# Patient Record
Sex: FEMALE | Race: WHITE | NOT HISPANIC OR LATINO | URBAN - METROPOLITAN AREA
[De-identification: names, ages, dates, MRNs, and addresses within clinical notes are randomized per-mention and may not be internally consistent; named-entity substitution may affect disease eponyms.]

---

## 2023-08-16 ENCOUNTER — APPOINTMENT (OUTPATIENT)
Dept: PRIMARY CARE | Facility: CLINIC | Age: 48
End: 2023-08-16
Payer: COMMERCIAL

## 2023-08-18 ENCOUNTER — OFFICE VISIT (OUTPATIENT)
Dept: PRIMARY CARE | Facility: CLINIC | Age: 48
End: 2023-08-18
Payer: COMMERCIAL

## 2023-08-18 VITALS
WEIGHT: 129 LBS | BODY MASS INDEX: 21.49 KG/M2 | HEART RATE: 84 BPM | SYSTOLIC BLOOD PRESSURE: 106 MMHG | TEMPERATURE: 98.2 F | RESPIRATION RATE: 20 BRPM | HEIGHT: 65 IN | DIASTOLIC BLOOD PRESSURE: 72 MMHG | OXYGEN SATURATION: 98 %

## 2023-08-18 DIAGNOSIS — J40 BRONCHITIS: ICD-10-CM

## 2023-08-18 DIAGNOSIS — R09.82 POST-NASAL DRIP: Primary | ICD-10-CM

## 2023-08-18 PROCEDURE — 99214 OFFICE O/P EST MOD 30 MIN: CPT | Performed by: FAMILY MEDICINE

## 2023-08-18 PROCEDURE — 1036F TOBACCO NON-USER: CPT | Performed by: FAMILY MEDICINE

## 2023-08-18 RX ORDER — FLUTICASONE PROPIONATE 50 MCG
2 SPRAY, SUSPENSION (ML) NASAL DAILY
Qty: 16 G | Refills: 1 | Status: SHIPPED | OUTPATIENT
Start: 2023-08-18 | End: 2023-10-24 | Stop reason: SDUPTHER

## 2023-08-18 RX ORDER — ALBUTEROL SULFATE 90 UG/1
2 AEROSOL, METERED RESPIRATORY (INHALATION) EVERY 4 HOURS PRN
Qty: 8.5 G | Refills: 0 | Status: SHIPPED | OUTPATIENT
Start: 2023-08-18 | End: 2024-08-17

## 2023-08-18 RX ORDER — DEXTROAMPHETAMINE SACCHARATE, AMPHETAMINE ASPARTATE MONOHYDRATE, DEXTROAMPHETAMINE SULFATE AND AMPHETAMINE SULFATE 1.25; 1.25; 1.25; 1.25 MG/1; MG/1; MG/1; MG/1
10 CAPSULE, EXTENDED RELEASE ORAL DAILY
COMMUNITY

## 2023-08-18 RX ORDER — PHENOL 1.4 %
1 AEROSOL, SPRAY (ML) MUCOUS MEMBRANE DAILY
COMMUNITY
Start: 2019-11-05

## 2023-08-18 RX ORDER — AZITHROMYCIN 250 MG/1
TABLET, FILM COATED ORAL
Qty: 6 TABLET | Refills: 0 | Status: SHIPPED | OUTPATIENT
Start: 2023-08-18 | End: 2023-08-23

## 2023-08-18 RX ORDER — SERTRALINE HYDROCHLORIDE 100 MG/1
100 TABLET, FILM COATED ORAL DAILY
COMMUNITY

## 2023-08-18 RX ORDER — ALPRAZOLAM 0.25 MG/1
TABLET ORAL WEEKLY
COMMUNITY
Start: 2019-09-24

## 2023-08-18 ASSESSMENT — ENCOUNTER SYMPTOMS: WHEEZING: 1

## 2023-08-18 NOTE — PATIENT INSTRUCTIONS
Today we have addressed your cough which I feel is due to reactive airway disease and bronchitis  I have advised an inhaler, abx and CXR as well as flonase and mucinex (otc)    Follow up if no improvement or if symptoms worsen.    Follow up when results received.
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
## 2023-10-15 RX ORDER — KETOCONAZOLE 20 MG/ML
SHAMPOO, SUSPENSION TOPICAL
COMMUNITY
Start: 2023-08-08

## 2023-10-15 RX ORDER — MINOXIDIL 2.5 MG/1
1 TABLET ORAL DAILY
COMMUNITY
Start: 2023-02-06 | End: 2023-10-24 | Stop reason: ALTCHOICE

## 2023-10-16 PROBLEM — M79.9 POSTURAL STRAIN: Status: ACTIVE | Noted: 2023-10-15

## 2023-10-16 PROBLEM — M79.10 MYALGIA: Status: ACTIVE | Noted: 2023-10-15

## 2023-10-16 PROBLEM — N92.6 IRREGULAR MENSTRUAL CYCLE: Status: ACTIVE | Noted: 2023-10-15

## 2023-10-16 PROBLEM — R35.0 URINARY FREQUENCY: Status: ACTIVE | Noted: 2023-10-16

## 2023-10-16 PROBLEM — F41.1 GENERALIZED ANXIETY DISORDER: Status: ACTIVE | Noted: 2019-05-01

## 2023-10-16 PROBLEM — H69.90 ETD (EUSTACHIAN TUBE DYSFUNCTION): Status: ACTIVE | Noted: 2023-10-15

## 2023-10-16 PROBLEM — M79.652 PAIN OF LEFT THIGH: Status: ACTIVE | Noted: 2023-10-15

## 2023-10-16 PROBLEM — H92.09 OTALGIA: Status: ACTIVE | Noted: 2023-10-15

## 2023-10-16 PROBLEM — L03.213 PRESEPTAL CELLULITIS OF LEFT UPPER EYELID: Status: ACTIVE | Noted: 2023-10-15

## 2023-10-16 PROBLEM — M54.2 CERVICALGIA: Status: ACTIVE | Noted: 2023-10-15

## 2023-10-16 PROBLEM — S37.69XA UTERINE RUPTURE: Status: ACTIVE | Noted: 2017-08-17

## 2023-10-16 PROBLEM — R12 HEARTBURN: Status: ACTIVE | Noted: 2023-10-15

## 2023-10-16 PROBLEM — R92.8 ABNORMAL MAMMOGRAM: Status: ACTIVE | Noted: 2023-10-15

## 2023-10-16 PROBLEM — M89.9 SCAPULAR DYSFUNCTION: Status: ACTIVE | Noted: 2023-10-15

## 2023-10-16 PROBLEM — R92.2 DENSE BREASTS: Status: ACTIVE | Noted: 2023-10-15

## 2023-10-16 PROBLEM — F41.0 PANIC DISORDER: Status: ACTIVE | Noted: 2023-10-15

## 2023-10-16 PROBLEM — N61.0 MASTITIS: Status: ACTIVE | Noted: 2023-10-16

## 2023-10-16 PROBLEM — E55.9 HYPOVITAMINOSIS D: Status: ACTIVE | Noted: 2023-10-15

## 2023-10-16 PROBLEM — R39.15 URINARY URGENCY: Status: ACTIVE | Noted: 2023-10-16

## 2023-10-16 PROBLEM — M99.09 SEGMENTAL AND SOMATIC DYSFUNCTION: Status: ACTIVE | Noted: 2023-10-15

## 2023-10-16 PROBLEM — S16.1XXA STRAIN OF STERNOCLEIDOMASTOID MUSCLE: Status: ACTIVE | Noted: 2023-10-16

## 2023-10-16 PROBLEM — E04.1 THYROID NODULE: Status: ACTIVE | Noted: 2023-10-16

## 2023-10-16 PROBLEM — F45.8 GRINDING OF TEETH: Status: ACTIVE | Noted: 2023-10-15

## 2023-10-16 PROBLEM — R00.0 TACHYCARDIA: Status: ACTIVE | Noted: 2023-10-15

## 2023-10-16 PROBLEM — M79.662 PAIN OF LEFT CALF: Status: ACTIVE | Noted: 2023-10-15

## 2023-10-16 PROBLEM — M54.42 CHRONIC LEFT-SIDED LOW BACK PAIN WITH LEFT-SIDED SCIATICA: Status: ACTIVE | Noted: 2023-10-15

## 2023-10-16 PROBLEM — R92.30 DENSE BREASTS: Status: ACTIVE | Noted: 2023-10-15

## 2023-10-16 PROBLEM — R39.12 WEAK URINE STREAM: Status: ACTIVE | Noted: 2023-10-16

## 2023-10-16 PROBLEM — R35.1 NOCTURIA: Status: ACTIVE | Noted: 2023-10-15

## 2023-10-16 PROBLEM — M26.69 TMJ INFLAMMATION: Status: ACTIVE | Noted: 2023-10-16

## 2023-10-16 PROBLEM — R79.89 ABNORMAL CBC: Status: ACTIVE | Noted: 2023-10-15

## 2023-10-16 PROBLEM — M54.6 THORACIC SPINE PAIN: Status: ACTIVE | Noted: 2023-10-15

## 2023-10-16 PROBLEM — K42.9 UMBILICAL HERNIA WITHOUT OBSTRUCTION OR GANGRENE: Status: ACTIVE | Noted: 2019-04-24

## 2023-10-16 PROBLEM — H00.014 HORDEOLUM OF LEFT UPPER EYELID: Status: ACTIVE | Noted: 2023-10-15

## 2023-10-16 PROBLEM — F41.0 PANIC ATTACK: Status: ACTIVE | Noted: 2023-10-15

## 2023-10-16 PROBLEM — G89.29 CHRONIC LEFT-SIDED LOW BACK PAIN WITH LEFT-SIDED SCIATICA: Status: ACTIVE | Noted: 2023-10-15

## 2023-10-16 PROBLEM — E78.5 HYPERLIPIDEMIA: Status: ACTIVE | Noted: 2023-10-15

## 2023-10-16 PROBLEM — L65.9 HAIR LOSS: Status: ACTIVE | Noted: 2023-10-15

## 2023-10-16 ASSESSMENT — ENCOUNTER SYMPTOMS
DIZZINESS: 0
APNEA: 0
UNEXPECTED WEIGHT CHANGE: 0
DIFFICULTY URINATING: 0
EYE PAIN: 0
CHILLS: 0
AGITATION: 0
ABDOMINAL PAIN: 0
VOMITING: 0

## 2023-10-16 NOTE — PROGRESS NOTES
Subjective   Patient ID: Magda Hough is a 48 y.o. female who presents today for the examination and treatment of pain involving their L sacral pain, middle back stiffness.    This is visit 7 of the calendar year.  [place].    Fall risk: None. No falls in the last 6 months.     HPI -she explains that overall she is doing better than at her last appointment, however she continues to have left sacral pain as her chief complaint.  She did go to [place] the weekends with her children and  this past weekend and did ride a couple rides.  She try to be mindful of what rides she was going on as to not jarring her back but she does mention that the back was more sore after this.  She was happy to have an appointment scheduled today.    Patient describes the pain as stiffness, numbness, tingling, and dull, and rates the current pain 4 out of 10 (10 being the worst).     9/27/23: Magda presents today with L sided hip/low back, and mid back pain. She notes her L hip feels a lot better following her last appointment and has improved dramatically. She is open to trying dry needling out again but would rather not if treatment does not require it. Will focus treatment today on mid back and L hip/low back regions today and will check full spine for any joint restrictions and muscular imbalances. No additional injuries or changes in medical history.      ReEXAM 4/28/23: Magda has not been in for treatment since December 2022. Therefore it is been approximately 4 months since our last treatment/encounter. She explains that about 4 to 6 weeks ago she started to notice a pain in the right side of her middle back near her right shoulder blade. This has gotten slightly worse over the last few weeks and she has intense pain with sneezing and certain movements. Occasionally she will feel tingling and numbness into the arm and feels as if there may be a pinched nerve. She has tried some self massage without being able to identify a  "specific location of pain. She is nervous that she may have a more serious condition but wanted to see if it was a musculoskeletal.     INITIAL INTAKE/SUBJECTIVE 10/24/22: She was referred here by her primary care physician, Dr. Quiana Aleman at her most recent wellness visit in 2022. She explains that her lower back pain started approximately 8 years ago after she had her first child. She explains that she did have an epidural and a  section and is unsure if this was the trigger or onset of the back pain. She also had a miscarriage with a ruptured uterus which required extensive repair before having her second child about 4 years ago with another  section.     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 She was seeing his local chiropractor 2-3 times a week for quite some time and found that it did help her moderately however she felt that the massage helped even more. She also received x-rays around this timeframe which was a couple of years ago.     Today she has pain into primary locations the neck and upper back and the lower back and left glute and leg.There is no headaches currently and she does not report any significant numbness and tingling in the hands or feet. There is also no noticeable weakness or change in her gait etc. she additionally has pain in the left glute and left hamstring region. The used to be pain in the calf but this has since resolved.     She does not recall any specific physical trauma. She does deal with PTSD after a work issue. She is currently working with her therapist and her primary care physician taking Xanax as needed for panic attacks and Zoloft for her anxiety and depression. She also is currently being treated for ADHD.     Review of Systems   Constitutional:  Negative " for chills and unexpected weight change.   HENT:  Negative for congestion and tinnitus.    Eyes:  Negative for pain.   Respiratory:  Negative for apnea.    Cardiovascular:  Negative for chest pain.   Gastrointestinal:  Negative for abdominal pain and vomiting.   Endocrine: Negative for cold intolerance and heat intolerance.   Genitourinary:  Negative for difficulty urinating and enuresis.   Allergic/Immunologic: Negative for immunocompromised state.   Neurological:  Negative for dizziness.   Psychiatric/Behavioral:  Negative for agitation.         + PTSD, ADHD, anxiety and depression     Objective   Observation : normal gait and forward head posture    Physical Exam    Examination findings (palpation & ROM): Tenderness to palpation with hypertonicity and trigger points palpated throughout the left piriformis, left gluteal region, left TFL and left greater than right lumbar paraspinals.  Additional hypertonicity palpated throughout the bilateral upper trapezii and cervical/thoracic paraspinals.      Segmental joint dysfunction was identified in the following areas using motion palpation and/or pain provocation assessment:  Cervical: C0, C1, C2 and C3.   Thoracic: T4, T5, T6, T7, T8, T11 and T12.   Lumbar: L3, L4 and L5.   Sacrum: L5/S1 and left SI.     Technique Used: diversified CMT and manual traction.    Today's treatment:  Performed spinal manipulation to the regions of segmental dysfunction identified on examination using age-appropriate and injury specific force, and manual diversified technique.     Manual Therapy ([id number]), Time In: 2:00 PM, Time Out: 2:15 PM, 1 Units. Ischemic compression and Soft-tissue manipulation . Brief supine MFR to the neck and upper back prior to manipulation. Prone IC to the L thoracic/lumbar paraspinals, L SIJ, L glutes.      Treatment Plan:   The patient and I discussed the risks and benefits of chiropractic care. Based on the patient's subjective complaints along with the  examination findings, it is advised that a course of chiropractic treatment by initiated. Consent for care was given both written and orally by the patient. The patient tolerated today's treatment with little or no additional discomfort and was instructed to contact the office for questions or concerns.     Treatment Frequency: Will see/treat patient every 2-4 weeks as therapeutic benefit is sustained, then frequency will be reduced to as needed for symptom management or as needed for acute flare-ups/exacerbation.     Please note: Voice-to-text software was used when completing this note.  While the note was proofread, portions may include grammatical errors.  Please contact me with any questions/concerns as it relates to these types of errors.

## 2023-10-17 ENCOUNTER — ALLIED HEALTH (OUTPATIENT)
Dept: INTEGRATIVE MEDICINE | Facility: CLINIC | Age: 48
End: 2023-10-17
Payer: COMMERCIAL

## 2023-10-17 DIAGNOSIS — M99.04 SEGMENTAL AND SOMATIC DYSFUNCTION OF SACRAL REGION: ICD-10-CM

## 2023-10-17 DIAGNOSIS — M99.00 SEGMENTAL AND SOMATIC DYSFUNCTION OF HEAD REGION: ICD-10-CM

## 2023-10-17 DIAGNOSIS — M54.6 CHRONIC BILATERAL THORACIC BACK PAIN: ICD-10-CM

## 2023-10-17 DIAGNOSIS — M99.02 SEGMENTAL AND SOMATIC DYSFUNCTION OF THORACIC REGION: ICD-10-CM

## 2023-10-17 DIAGNOSIS — G89.29 CHRONIC BILATERAL THORACIC BACK PAIN: ICD-10-CM

## 2023-10-17 DIAGNOSIS — M79.9 POSTURAL STRAIN: ICD-10-CM

## 2023-10-17 DIAGNOSIS — M79.10 MYALGIA: ICD-10-CM

## 2023-10-17 DIAGNOSIS — M53.3 SACRAL BACK PAIN: ICD-10-CM

## 2023-10-17 DIAGNOSIS — M99.05 SEGMENTAL AND SOMATIC DYSFUNCTION OF PELVIC REGION: Primary | ICD-10-CM

## 2023-10-17 DIAGNOSIS — M99.03 SEGMENTAL AND SOMATIC DYSFUNCTION OF LUMBAR REGION: ICD-10-CM

## 2023-10-17 DIAGNOSIS — M54.2 NECK PAIN: ICD-10-CM

## 2023-10-17 DIAGNOSIS — M99.01 SEGMENTAL AND SOMATIC DYSFUNCTION OF CERVICAL REGION: ICD-10-CM

## 2023-10-24 ENCOUNTER — OFFICE VISIT (OUTPATIENT)
Dept: PRIMARY CARE | Facility: CLINIC | Age: 48
End: 2023-10-24
Payer: COMMERCIAL

## 2023-10-24 VITALS
HEART RATE: 96 BPM | BODY MASS INDEX: 21.49 KG/M2 | RESPIRATION RATE: 16 BRPM | HEIGHT: 65 IN | WEIGHT: 129 LBS | SYSTOLIC BLOOD PRESSURE: 112 MMHG | TEMPERATURE: 98.7 F | OXYGEN SATURATION: 98 % | DIASTOLIC BLOOD PRESSURE: 62 MMHG

## 2023-10-24 DIAGNOSIS — H92.01 RIGHT EAR PAIN: Primary | ICD-10-CM

## 2023-10-24 DIAGNOSIS — H69.90 DYSFUNCTION OF EUSTACHIAN TUBE, UNSPECIFIED LATERALITY: ICD-10-CM

## 2023-10-24 DIAGNOSIS — R09.82 POST-NASAL DRIP: ICD-10-CM

## 2023-10-24 PROCEDURE — 1036F TOBACCO NON-USER: CPT | Performed by: FAMILY MEDICINE

## 2023-10-24 PROCEDURE — 99213 OFFICE O/P EST LOW 20 MIN: CPT | Performed by: FAMILY MEDICINE

## 2023-10-24 RX ORDER — FLUTICASONE PROPIONATE 50 MCG
2 SPRAY, SUSPENSION (ML) NASAL DAILY
Qty: 16 G | Refills: 1 | Status: SHIPPED | OUTPATIENT
Start: 2023-10-24 | End: 2023-12-15 | Stop reason: WASHOUT

## 2023-10-24 RX ORDER — BUPROPION HYDROCHLORIDE 150 MG/1
TABLET ORAL
COMMUNITY
Start: 2023-10-12

## 2023-10-24 NOTE — PROGRESS NOTES
"Subjective   Patient ID: Madga Hough is a 48 y.o. female who presents for Earache (Right ear pain, onset a few days ago. No congestion or sore throat. Slight post nasal drip).    Last night ear started to wake her up in the middle of the night.  The past couple of days it felt really full.  [place] was at [place] and it was really windy.  She also had walked her dog and felt the wind was hurting her ear. Some post nasal drip but no fevers.      She hasn't taken anything for the pain.      No fevers/chills/cough.      Earache          Review of Systems   HENT:  Positive for ear pain.        Objective   /62   Pulse 96   Temp 37.1 °C (98.7 °F)   Resp 16   Ht 1.651 m (5' 5\")   Wt 58.5 kg (129 lb)   SpO2 98%   BMI 21.47 kg/m²     Physical Exam  Constitutional:       Appearance: Normal appearance.   HENT:      Head: Normocephalic and atraumatic.      Right Ear: Tympanic membrane normal.      Left Ear: Tympanic membrane normal.      Nose: Congestion and rhinorrhea present.      Mouth/Throat:      Mouth: Mucous membranes are moist.   Cardiovascular:      Rate and Rhythm: Normal rate and regular rhythm.      Pulses: Normal pulses.   Pulmonary:      Effort: Pulmonary effort is normal. No respiratory distress.      Breath sounds: Normal breath sounds. No stridor. No wheezing, rhonchi or rales.   Musculoskeletal:      Cervical back: Normal range of motion and neck supple.   Skin:     General: Skin is warm and dry.   Neurological:      Mental Status: She is alert.   Psychiatric:         Mood and Affect: Mood normal.         Assessment/Plan   Diagnoses and all orders for this visit:  Right ear pain  Dysfunction of Eustachian tube, unspecified laterality  Post-nasal drip  -     fluticasone (Flonase) 50 mcg/actuation nasal spray; Administer 2 sprays into each nostril once daily. Shake gently. Before first use, prime pump. After use, clean tip and replace cap.         "

## 2023-10-31 ASSESSMENT — ENCOUNTER SYMPTOMS
UNEXPECTED WEIGHT CHANGE: 0
EYE PAIN: 0
DIZZINESS: 0
AGITATION: 0
ABDOMINAL PAIN: 0
APNEA: 0
DIFFICULTY URINATING: 0
VOMITING: 0
CHILLS: 0

## 2023-10-31 NOTE — PROGRESS NOTES
Subjective   Patient ID: Magda Hough is a 48 y.o. female who presents today for the treatment of pain involving their L sacral pain, middle back stiffness.    This is visit 8 of the calendar year. [place] (no limit)    Fall risk: None. No falls in the last 6 months.     ALVA - Magda presents today with the same complaints of left sided sacral pain and mid back stiffness. She continues to have discomfort but is feeling better than she typically does. She would like to a general treatment for her full spine and check for any joint restrictions and/or muscular imbalances. No additional injuries or changes in her medical history.    Patient describes the pain as stiffness, numbness, tingling, and dull, and rates the current pain 4 out of 10 (10 being the worst).     Last treatment visit 10/17/23: She explains that overall she is doing better than at her last appointment, however she continues to have left sacral pain as her chief complaint.  She did go to [place] the weekends with her children and  this past weekend and did ride a couple rides.  She try to be mindful of what rides she was going on as to not jarring her back but she does mention that the back was more sore after this.  She was happy to have an appointment scheduled today.     ReEXAM 4/28/23: Magda has not been in for treatment since December 2022. Therefore it is been approximately 4 months since our last treatment/encounter. She explains that about 4 to 6 weeks ago she started to notice a pain in the right side of her middle back near her right shoulder blade. This has gotten slightly worse over the last few weeks and she has intense pain with sneezing and certain movements. Occasionally she will feel tingling and numbness into the arm and feels as if there may be a pinched nerve. She has tried some self massage without being able to identify a specific location of pain. She is nervous that she may have a more serious condition but wanted to see if  "it was a musculoskeletal.     INITIAL INTAKE/SUBJECTIVE 10/24/22: She was referred here by her primary care physician, Dr. Quiana Aleman at her most recent wellness visit in 2022. She explains that her lower back pain started approximately 8 years ago after she had her first child. She explains that she did have an epidural and a  section and is unsure if this was the trigger or onset of the back pain. She also had a miscarriage with a ruptured uterus which required extensive repair before having her second child about 4 years ago with another  section.     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 She was seeing his local chiropractor 2-3 times a week for quite some time and found that it did help her moderately however she felt that the massage helped even more. She also received x-rays around this timeframe which was a couple of years ago.     Today she has pain into primary locations the neck and upper back and the lower back and left glute and leg.There is no headaches currently and she does not report any significant numbness and tingling in the hands or feet. There is also no noticeable weakness or change in her gait etc. she additionally has pain in the left glute and left hamstring region. The used to be pain in the calf but this has since resolved.     She does not recall any specific physical trauma. She does deal with PTSD after a work issue. She is currently working with her therapist and her primary care physician taking Xanax as needed for panic attacks and Zoloft for her anxiety and depression. She also is currently being treated for ADHD.     Review of Systems   Constitutional:  Negative for chills and unexpected weight change.   HENT:  Negative for congestion and tinnitus.    Eyes:  Negative " for pain.   Respiratory:  Negative for apnea.    Cardiovascular:  Negative for chest pain.   Gastrointestinal:  Negative for abdominal pain and vomiting.   Endocrine: Negative for cold intolerance and heat intolerance.   Genitourinary:  Negative for difficulty urinating and enuresis.        +    Allergic/Immunologic: Negative for immunocompromised state.   Neurological:  Negative for dizziness.   Psychiatric/Behavioral:  Negative for agitation.         + PTSD, ADHD, anxiety and depression     Objective   Observation : normal gait and forward head posture    Physical Exam  Examination findings (palpation & ROM): Tenderness to palpation with hypertonicity and trigger points palpated throughout the left piriformis, left gluteal region, left TFL and left greater than right lumbar paraspinals.  Additional hypertonicity palpated throughout the bilateral upper trapezii and cervical/thoracic paraspinals.    Segmental joint dysfunction was identified in the following areas using motion palpation and/or pain provocation assessment:  Cervical: C0-C3 (Supine)   Thoracic: T4-T11 (Prone)  Lumbar: L3-L5 (Side-lying)   Sacrum: L5/S1 and left SI (Drop)    Today's treatment:  Performed spinal manipulation to the regions of segmental dysfunction identified on examination using age-appropriate and injury specific force, and manual diversified technique. Technique Used: Diversified CMT and manual traction.    Manual Therapy ([id number]), Time In: 12:00 PM, Time Out: 12:15 PM, 1 Units. Ischemic compression and Soft-tissue manipulation. Brief supine MFR to the neck and upper back prior to manipulation. Prone IC to the L thoracic/lumbar paraspinals, L SIJ, L glutes.      Treatment Plan:   The patient and I discussed the risks and benefits of chiropractic care. Based on the patient's subjective complaints along with the examination findings, it is advised that a course of chiropractic treatment by initiated. Consent for care was given  both written and orally by the patient. The patient tolerated today's treatment with little or no additional discomfort and was instructed to contact the office for questions or concerns.     Treatment Frequency: Will see/treat patient every 2-4 weeks as therapeutic benefit is sustained, then frequency will be reduced to as needed for symptom management or as needed for acute flare-ups/exacerbation.     Please note: Voice-to-text software was used when completing this note.  While the note was proofread, portions may include grammatical errors.  Please contact me with any questions/concerns as it relates to these types of errors.

## 2023-11-01 ENCOUNTER — ALLIED HEALTH (OUTPATIENT)
Dept: INTEGRATIVE MEDICINE | Facility: CLINIC | Age: 48
End: 2023-11-01
Payer: COMMERCIAL

## 2023-11-01 DIAGNOSIS — M99.02 SEGMENTAL AND SOMATIC DYSFUNCTION OF THORACIC REGION: ICD-10-CM

## 2023-11-01 DIAGNOSIS — M99.01 SEGMENTAL AND SOMATIC DYSFUNCTION OF CERVICAL REGION: Primary | ICD-10-CM

## 2023-11-01 DIAGNOSIS — M99.05 SEGMENTAL AND SOMATIC DYSFUNCTION OF PELVIC REGION: ICD-10-CM

## 2023-11-01 DIAGNOSIS — M99.03 SEGMENTAL AND SOMATIC DYSFUNCTION OF LUMBAR REGION: ICD-10-CM

## 2023-11-01 DIAGNOSIS — G89.29 CHRONIC BILATERAL THORACIC BACK PAIN: ICD-10-CM

## 2023-11-01 DIAGNOSIS — M53.3 SACRAL BACK PAIN: ICD-10-CM

## 2023-11-01 DIAGNOSIS — M99.00 SEGMENTAL AND SOMATIC DYSFUNCTION OF HEAD REGION: ICD-10-CM

## 2023-11-01 DIAGNOSIS — M54.2 NECK PAIN: ICD-10-CM

## 2023-11-01 DIAGNOSIS — M99.04 SEGMENTAL AND SOMATIC DYSFUNCTION OF SACRAL REGION: ICD-10-CM

## 2023-11-01 DIAGNOSIS — M79.10 MYALGIA: ICD-10-CM

## 2023-11-01 DIAGNOSIS — M79.9 POSTURAL STRAIN: ICD-10-CM

## 2023-11-01 DIAGNOSIS — M54.6 CHRONIC BILATERAL THORACIC BACK PAIN: ICD-10-CM

## 2023-11-01 PROCEDURE — 97140 MANUAL THERAPY 1/> REGIONS: CPT | Performed by: CHIROPRACTOR

## 2023-11-01 PROCEDURE — 98942 CHIROPRACTIC MANJ 5 REGIONS: CPT | Performed by: CHIROPRACTOR

## 2023-11-13 ASSESSMENT — ENCOUNTER SYMPTOMS
CHILLS: 0
APNEA: 0
AGITATION: 0
DIFFICULTY URINATING: 0
UNEXPECTED WEIGHT CHANGE: 0
VOMITING: 0
EYE PAIN: 0
ABDOMINAL PAIN: 0
DIZZINESS: 0

## 2023-11-13 NOTE — PROGRESS NOTES
Subjective   Patient ID: Magda Hough is a 48 y.o. female who presents today for the treatment of pain involving their L sacral pain, middle back stiffness.    This is visit 9 of the calendar year. [place] (no limit)    Fall risk: None. No falls in the last 6 months.     HPI -overall Magda is doing quite well today.  No specific pain issues.  Here mostly for maintenance.  She has noticed some numbness and tingling into the left hand so we will check the hand forearm and shoulder as well as the neck today.    Patient describes the pain as stiffness, numbness, tingling, and dull, and rates the current pain 4 out of 10 (10 being the worst).     11/1/23: Magda presents today with the same complaints of left sided sacral pain and mid back stiffness. She continues to have discomfort but is feeling better than she typically does. She would like to a general treatment for her full spine and check for any joint restrictions and/or muscular imbalances. No additional injuries or changes in her medical history.    Last treatment visit 10/17/23: She explains that overall she is doing better than at her last appointment, however she continues to have left sacral pain as her chief complaint.  She did go to [place] the weekends with her children and  this past weekend and did ride a couple rides.  She try to be mindful of what rides she was going on as to not jarring her back but she does mention that the back was more sore after this.  She was happy to have an appointment scheduled today.     ReEXAM 4/28/23: Magda has not been in for treatment since December 2022. Therefore it is been approximately 4 months since our last treatment/encounter. She explains that about 4 to 6 weeks ago she started to notice a pain in the right side of her middle back near her right shoulder blade. This has gotten slightly worse over the last few weeks and she has intense pain with sneezing and certain movements. Occasionally she will feel tingling  "and numbness into the arm and feels as if there may be a pinched nerve. She has tried some self massage without being able to identify a specific location of pain. She is nervous that she may have a more serious condition but wanted to see if it was a musculoskeletal.     INITIAL INTAKE/SUBJECTIVE 10/24/22: She was referred here by her primary care physician, Dr. Quiana Aleman at her most recent wellness visit in 2022. She explains that her lower back pain started approximately 8 years ago after she had her first child. She explains that she did have an epidural and a  section and is unsure if this was the trigger or onset of the back pain. She also had a miscarriage with a ruptured uterus which required extensive repair before having her second child about 4 years ago with another  section.     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 She was seeing his local chiropractor 2-3 times a week for quite some time and found that it did help her moderately however she felt that the massage helped even more. She also received x-rays around this timeframe which was a couple of years ago.     Today she has pain into primary locations the neck and upper back and the lower back and left glute and leg.There is no headaches currently and she does not report any significant numbness and tingling in the hands or feet. There is also no noticeable weakness or change in her gait etc. she additionally has pain in the left glute and left hamstring region. The used to be pain in the calf but this has since resolved.     She does not recall any specific physical trauma. She does deal with PTSD after a work issue. She is currently working with her therapist and her primary care physician taking Xanax as needed for panic attacks and " Zoloft for her anxiety and depression. She also is currently being treated for ADHD.     Review of Systems   Constitutional:  Negative for chills and unexpected weight change.   HENT:  Negative for congestion and tinnitus.    Eyes:  Negative for pain.   Respiratory:  Negative for apnea.    Cardiovascular:  Negative for chest pain.   Gastrointestinal:  Negative for abdominal pain and vomiting.   Endocrine: Negative for cold intolerance and heat intolerance.   Genitourinary:  Negative for difficulty urinating and enuresis.        +    Allergic/Immunologic: Negative for immunocompromised state.   Neurological:  Negative for dizziness.   Psychiatric/Behavioral:  Negative for agitation.         + PTSD, ADHD, anxiety and depression     Objective   Observation : normal gait and forward head posture    Physical Exam  Examination findings (palpation & ROM): Tenderness to palpation with hypertonicity throughout the bilateral upper trapezii and cervical/thoracic paraspinals as well as the left forearm extensors, left pec and left greater than right levator scapula..    Segmental joint dysfunction was identified in the following areas using motion palpation and/or pain provocation assessment:  Cervical: C0-C3 (Supine)   Thoracic: T4-T11 (Prone)  Lumbar: L3-L5 (Side-lying)   Sacrum: L5/S1 and left SI (Drop)    Today's treatment:  Performed spinal manipulation to the regions of segmental dysfunction identified on examination using age-appropriate and injury specific force, and manual diversified technique. Technique Used: Diversified CMT and manual traction.    Manual Therapy (21464), Time In: 2:00 PM, Time Out: 2:15 PM, 1 Units. Ischemic compression and Soft-tissue manipulation.  Supine myofascial release to the left forearm extensors, left pec, left shoulder and left greater than right neck and upper back. Brief supine MFR to the neck and upper back prior to manipulation. Prone IC to the L thoracic/lumbar paraspinals,  ISMAEL LOUISE.      Treatment Plan:   The patient and I discussed the risks and benefits of chiropractic care. Based on the patient's subjective complaints along with the examination findings, it is advised that a course of chiropractic treatment by initiated. Consent for care was given both written and orally by the patient. The patient tolerated today's treatment with little or no additional discomfort and was instructed to contact the office for questions or concerns.     Treatment Frequency: Will see/treat patient every 2-4 weeks as therapeutic benefit is sustained, then frequency will be reduced to as needed for symptom management or as needed for acute flare-ups/exacerbation.     Please note: Voice-to-text software was used when completing this note.  While the note was proofread, portions may include grammatical errors.  Please contact me with any questions/concerns as it relates to these types of errors.

## 2023-11-14 ENCOUNTER — ALLIED HEALTH (OUTPATIENT)
Dept: INTEGRATIVE MEDICINE | Facility: CLINIC | Age: 48
End: 2023-11-14
Payer: COMMERCIAL

## 2023-11-14 DIAGNOSIS — M99.04 SEGMENTAL AND SOMATIC DYSFUNCTION OF SACRAL REGION: ICD-10-CM

## 2023-11-14 DIAGNOSIS — M79.10 MYALGIA: ICD-10-CM

## 2023-11-14 DIAGNOSIS — M54.2 NECK PAIN: ICD-10-CM

## 2023-11-14 DIAGNOSIS — M99.00 SEGMENTAL AND SOMATIC DYSFUNCTION OF HEAD REGION: ICD-10-CM

## 2023-11-14 DIAGNOSIS — M25.512 LEFT SHOULDER PAIN, UNSPECIFIED CHRONICITY: ICD-10-CM

## 2023-11-14 DIAGNOSIS — M54.6 CHRONIC BILATERAL THORACIC BACK PAIN: ICD-10-CM

## 2023-11-14 DIAGNOSIS — M99.02 SEGMENTAL AND SOMATIC DYSFUNCTION OF THORACIC REGION: ICD-10-CM

## 2023-11-14 DIAGNOSIS — M99.05 SEGMENTAL AND SOMATIC DYSFUNCTION OF PELVIC REGION: ICD-10-CM

## 2023-11-14 DIAGNOSIS — M99.01 SEGMENTAL AND SOMATIC DYSFUNCTION OF CERVICAL REGION: Primary | ICD-10-CM

## 2023-11-14 DIAGNOSIS — M53.3 SACRAL BACK PAIN: ICD-10-CM

## 2023-11-14 DIAGNOSIS — M99.03 SEGMENTAL AND SOMATIC DYSFUNCTION OF LUMBAR REGION: ICD-10-CM

## 2023-11-14 DIAGNOSIS — G89.29 CHRONIC BILATERAL THORACIC BACK PAIN: ICD-10-CM

## 2023-11-14 DIAGNOSIS — M79.9 POSTURAL STRAIN: ICD-10-CM

## 2023-11-14 PROCEDURE — 98942 CHIROPRACTIC MANJ 5 REGIONS: CPT | Performed by: CHIROPRACTOR

## 2023-11-14 PROCEDURE — 97140 MANUAL THERAPY 1/> REGIONS: CPT | Performed by: CHIROPRACTOR

## 2023-11-16 ENCOUNTER — OFFICE VISIT (OUTPATIENT)
Dept: PRIMARY CARE | Facility: CLINIC | Age: 48
End: 2023-11-16
Payer: COMMERCIAL

## 2023-11-16 VITALS
WEIGHT: 132 LBS | SYSTOLIC BLOOD PRESSURE: 120 MMHG | TEMPERATURE: 98.5 F | OXYGEN SATURATION: 99 % | DIASTOLIC BLOOD PRESSURE: 78 MMHG | BODY MASS INDEX: 21.97 KG/M2 | RESPIRATION RATE: 16 BRPM | HEART RATE: 90 BPM

## 2023-11-16 DIAGNOSIS — J32.9 SINOBRONCHITIS: Primary | ICD-10-CM

## 2023-11-16 DIAGNOSIS — J40 SINOBRONCHITIS: Primary | ICD-10-CM

## 2023-11-16 PROBLEM — F98.8 ATTENTION DEFICIT DISORDER (ADD) WITHOUT HYPERACTIVITY: Status: ACTIVE | Noted: 2023-11-16

## 2023-11-16 PROCEDURE — 1036F TOBACCO NON-USER: CPT | Performed by: FAMILY MEDICINE

## 2023-11-16 PROCEDURE — 99214 OFFICE O/P EST MOD 30 MIN: CPT | Performed by: FAMILY MEDICINE

## 2023-11-16 RX ORDER — AZITHROMYCIN 250 MG/1
TABLET, FILM COATED ORAL
Qty: 6 TABLET | Refills: 0 | Status: SHIPPED | OUTPATIENT
Start: 2023-11-16 | End: 2023-11-21

## 2023-11-16 ASSESSMENT — ENCOUNTER SYMPTOMS
FATIGUE: 1
FEVER: 0
WHEEZING: 0
NAUSEA: 0
HEADACHES: 1
COUGH: 1
SHORTNESS OF BREATH: 0
SORE THROAT: 0
VOMITING: 0
DIFFICULTY URINATING: 0
MYALGIAS: 0
RHINORRHEA: 0
DIARRHEA: 0
SINUS PRESSURE: 1

## 2023-11-16 NOTE — ASSESSMENT & PLAN NOTE
Pt declined any testing today  Advise pt to take med as directed  May use zyrtec and flonase for congestion or may try mucinex d in am, tylenol or motrin as needed  F/up if no improvement

## 2023-11-16 NOTE — PROGRESS NOTES
Subjective   Patient ID: Magda Hough is a 48 y.o. female who presents for Sinusitis.    Sinusitis  Episode onset: 1 month. The problem is unchanged. There has been no fever. The pain is moderate. Associated symptoms include congestion, coughing, headaches and sinus pressure. Pertinent negatives include no ear pain, shortness of breath or sore throat.        Review of Systems   Constitutional:  Positive for fatigue. Negative for fever.        Symptoms x few wk. Sinus and cough  Pt has tried tylenol and flonase, zyrtec  Home covid test negative   HENT:  Positive for congestion, postnasal drip and sinus pressure. Negative for ear pain, rhinorrhea and sore throat.         Pt has colored disch.   Respiratory:  Positive for cough. Negative for shortness of breath and wheezing.    Gastrointestinal:  Negative for diarrhea, nausea and vomiting.   Genitourinary:  Negative for difficulty urinating.   Musculoskeletal:  Negative for myalgias.   Neurological:  Positive for headaches.       Objective   /78   Pulse 90   Temp 36.9 °C (98.5 °F)   Resp 16   Wt 59.9 kg (132 lb)   SpO2 99%   BMI 21.97 kg/m²     Physical Exam  Vitals and nursing note reviewed.   Constitutional:       General: She is not in acute distress.     Appearance: Normal appearance.   HENT:      Head: Normocephalic and atraumatic.      Right Ear: Tympanic membrane, ear canal and external ear normal.      Left Ear: Tympanic membrane, ear canal and external ear normal.      Nose: Congestion present.      Mouth/Throat:      Mouth: Mucous membranes are moist.      Pharynx: Oropharynx is clear.   Cardiovascular:      Rate and Rhythm: Normal rate and regular rhythm.      Heart sounds: Normal heart sounds.   Pulmonary:      Effort: Pulmonary effort is normal.      Breath sounds: Normal breath sounds.   Musculoskeletal:      Cervical back: Neck supple.   Lymphadenopathy:      Cervical: No cervical adenopathy.   Skin:     General: Skin is warm and dry.    Neurological:      Mental Status: She is alert.         Assessment/Plan   Problem List Items Addressed This Visit             ICD-10-CM    Sinobronchitis - Primary J32.9, J40     Pt declined any testing today  Advise pt to take med as directed  May use zyrtec and flonase for congestion or may try mucinex d in am, tylenol or motrin as needed  F/up if no improvement         Relevant Medications    azithromycin (Zithromax) 250 mg tablet

## 2023-12-05 ASSESSMENT — ENCOUNTER SYMPTOMS
ABDOMINAL PAIN: 0
AGITATION: 0
EYE PAIN: 0
VOMITING: 0
APNEA: 0
CHILLS: 0
DIZZINESS: 0
UNEXPECTED WEIGHT CHANGE: 0
DIFFICULTY URINATING: 0

## 2023-12-05 NOTE — PROGRESS NOTES
Subjective   Patient ID: Magda Hough is a 48 y.o. female who presents today for the treatment of pain involving their L sacral pain, middle back stiffness.    This is visit 10 of the calendar year. [place] (no limit)    Fall risk: None. No falls in the last 6 months.     HPI -  Overall no acute pain issues, she is still having L sacral soreness and tightness intermittently so this will be our focus of treatment today.     Patient describes the pain as stiffness, numbness, tingling, and dull, and rates the current pain 4 out of 10 (10 being the worst).     Last treatment 11/14/23: overall Magda is doing quite well today.  No specific pain issues.  Here mostly for maintenance.  She has noticed some numbness and tingling into the left hand so we will check the hand forearm and shoulder as well as the neck today.    11/1/23: Magda presents today with the same complaints of left sided sacral pain and mid back stiffness. She continues to have discomfort but is feeling better than she typically does. She would like to a general treatment for her full spine and check for any joint restrictions and/or muscular imbalances. No additional injuries or changes in her medical history.    10/17/23: She explains that overall she is doing better than at her last appointment, however she continues to have left sacral pain as her chief complaint.  She did go to [place] [employer] the weekends with her children and  this past weekend and did ride a couple rides.  She try to be mindful of what rides she was going on as to not jarring her back but she does mention that the back was more sore after this.  She was happy to have an appointment scheduled today.  ________  ReEXAM 4/28/23: Magda has not been in for treatment since December 2022. Therefore it is been approximately 4 months since our last treatment/encounter. She explains that about 4 to 6 weeks ago she started to notice a pain in the right side of her middle back near her right  "shoulder blade. This has gotten slightly worse over the last few weeks and she has intense pain with sneezing and certain movements. Occasionally she will feel tingling and numbness into the arm and feels as if there may be a pinched nerve. She has tried some self massage without being able to identify a specific location of pain. She is nervous that she may have a more serious condition but wanted to see if it was a musculoskeletal.     INITIAL INTAKE/SUBJECTIVE 10/24/22: She was referred here by her primary care physician, Dr. Quiana Aleman at her most recent wellness visit in 2022. She explains that her lower back pain started approximately 8 years ago after she had her first child. She explains that she did have an epidural and a  section and is unsure if this was the trigger or onset of the back pain. She also had a miscarriage with a ruptured uterus which required extensive repair before having her second child about 4 years ago with another  section.     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 She was seeing his local chiropractor 2-3 times a week for quite some time and found that it did help her moderately however she felt that the massage helped even more. She also received x-rays around this timeframe which was a couple of years ago.     Today she has pain into primary locations the neck and upper back and the lower back and left glute and leg.There is no headaches currently and she does not report any significant numbness and tingling in the hands or feet. There is also no noticeable weakness or change in her gait etc. she additionally has pain in the left glute and left hamstring region. The used to be pain in the calf but this has since resolved.     She does not recall any specific physical " trauma. She does deal with PTSD after a work issue. She is currently working with her therapist and her primary care physician taking Xanax as needed for panic attacks and Zoloft for her anxiety and depression. She also is currently being treated for ADHD.     Review of Systems   Constitutional:  Negative for chills and unexpected weight change.   HENT:  Negative for congestion and tinnitus.    Eyes:  Negative for pain.   Respiratory:  Negative for apnea.    Cardiovascular:  Negative for chest pain.   Gastrointestinal:  Negative for abdominal pain and vomiting.   Endocrine: Negative for cold intolerance and heat intolerance.   Genitourinary:  Negative for difficulty urinating and enuresis.        +    Allergic/Immunologic: Negative for immunocompromised state.   Neurological:  Negative for dizziness.   Psychiatric/Behavioral:  Negative for agitation.         + PTSD, ADHD, anxiety and depression     Objective   Observation : normal gait and forward head posture    Physical Exam  Examination findings (palpation & ROM): Tenderness to palpation with hypertonicity throughout the bilateral upper trapezii and cervical/thoracic paraspinals as well as the L sacrum and lower back.     Segmental joint dysfunction was identified in the following areas using motion palpation and/or pain provocation assessment:  Cervical: C0-C3 (Supine)   Thoracic: T4-T11 (Prone)  Lumbar: L3-L5 (Side-lying)   Sacrum: L5/S1 and left SI (Drop)    Today's treatment:  Performed spinal manipulation to the regions of segmental dysfunction identified on examination using age-appropriate and injury specific force, and manual diversified technique. Technique Used: Diversified CMT and manual traction.    Manual Therapy (15251), Time In: 9:20 AM, Time Out: 9:35 AM, 1 Units. Ischemic compression and Soft-tissue manipulation.  Supine myofascial release to the neck and upper back. Side-lying IASTM and MFR to the L gluteal and sacral regions. Prone IC  to the L thoracic/lumbar paraspinals, L SIJ, L glutes.      Treatment Plan:   The patient and I discussed the risks and benefits of chiropractic care. Based on the patient's subjective complaints along with the examination findings, it is advised that a course of chiropractic treatment by initiated. Consent for care was given both written and orally by the patient. The patient tolerated today's treatment with little or no additional discomfort and was instructed to contact the office for questions or concerns.     Treatment Frequency: Will see/treat patient every 2-4 weeks as therapeutic benefit is sustained, then frequency will be reduced to as needed for symptom management or as needed for acute flare-ups/exacerbation.     Please note: Voice-to-text software was used when completing this note.  While the note was proofread, portions may include grammatical errors.  Please contact me with any questions/concerns as it relates to these types of errors.

## 2023-12-06 ENCOUNTER — ALLIED HEALTH (OUTPATIENT)
Dept: INTEGRATIVE MEDICINE | Facility: CLINIC | Age: 48
End: 2023-12-06
Payer: COMMERCIAL

## 2023-12-06 DIAGNOSIS — M79.9 POSTURAL STRAIN: ICD-10-CM

## 2023-12-06 DIAGNOSIS — M99.01 SEGMENTAL AND SOMATIC DYSFUNCTION OF CERVICAL REGION: Primary | ICD-10-CM

## 2023-12-06 DIAGNOSIS — M53.3 SACRAL BACK PAIN: ICD-10-CM

## 2023-12-06 DIAGNOSIS — M54.2 NECK PAIN: ICD-10-CM

## 2023-12-06 DIAGNOSIS — M99.04 SEGMENTAL AND SOMATIC DYSFUNCTION OF SACRAL REGION: ICD-10-CM

## 2023-12-06 DIAGNOSIS — G89.29 CHRONIC BILATERAL THORACIC BACK PAIN: ICD-10-CM

## 2023-12-06 DIAGNOSIS — M54.6 CHRONIC BILATERAL THORACIC BACK PAIN: ICD-10-CM

## 2023-12-06 DIAGNOSIS — M99.00 SEGMENTAL AND SOMATIC DYSFUNCTION OF HEAD REGION: ICD-10-CM

## 2023-12-06 DIAGNOSIS — M99.05 SEGMENTAL AND SOMATIC DYSFUNCTION OF PELVIC REGION: ICD-10-CM

## 2023-12-06 DIAGNOSIS — M79.10 MYALGIA: ICD-10-CM

## 2023-12-06 DIAGNOSIS — M99.03 SEGMENTAL AND SOMATIC DYSFUNCTION OF LUMBAR REGION: ICD-10-CM

## 2023-12-06 DIAGNOSIS — M99.02 SEGMENTAL AND SOMATIC DYSFUNCTION OF THORACIC REGION: ICD-10-CM

## 2023-12-06 PROCEDURE — 98942 CHIROPRACTIC MANJ 5 REGIONS: CPT | Performed by: CHIROPRACTOR

## 2023-12-06 PROCEDURE — 97140 MANUAL THERAPY 1/> REGIONS: CPT | Performed by: CHIROPRACTOR

## 2023-12-15 ENCOUNTER — OFFICE VISIT (OUTPATIENT)
Dept: PRIMARY CARE | Facility: CLINIC | Age: 48
End: 2023-12-15
Payer: COMMERCIAL

## 2023-12-15 VITALS
HEART RATE: 93 BPM | OXYGEN SATURATION: 99 % | DIASTOLIC BLOOD PRESSURE: 78 MMHG | WEIGHT: 132 LBS | SYSTOLIC BLOOD PRESSURE: 112 MMHG | BODY MASS INDEX: 21.97 KG/M2 | TEMPERATURE: 98.3 F | RESPIRATION RATE: 16 BRPM

## 2023-12-15 DIAGNOSIS — J32.9 SINOBRONCHITIS: Primary | ICD-10-CM

## 2023-12-15 DIAGNOSIS — J02.9 SORE THROAT: ICD-10-CM

## 2023-12-15 DIAGNOSIS — J40 SINOBRONCHITIS: Primary | ICD-10-CM

## 2023-12-15 LAB — POC RAPID STREP: NEGATIVE

## 2023-12-15 PROCEDURE — 99213 OFFICE O/P EST LOW 20 MIN: CPT | Performed by: NURSE PRACTITIONER

## 2023-12-15 PROCEDURE — 87651 STREP A DNA AMP PROBE: CPT

## 2023-12-15 PROCEDURE — 87880 STREP A ASSAY W/OPTIC: CPT | Performed by: NURSE PRACTITIONER

## 2023-12-15 PROCEDURE — 1036F TOBACCO NON-USER: CPT | Performed by: NURSE PRACTITIONER

## 2023-12-15 RX ORDER — AZELASTINE 1 MG/ML
1 SPRAY, METERED NASAL 2 TIMES DAILY
Qty: 30 ML | Refills: 0 | Status: SHIPPED | OUTPATIENT
Start: 2023-12-15 | End: 2024-12-14

## 2023-12-15 RX ORDER — DOXYCYCLINE 100 MG/1
100 CAPSULE ORAL 2 TIMES DAILY
Qty: 20 CAPSULE | Refills: 0 | Status: SHIPPED | OUTPATIENT
Start: 2023-12-15 | End: 2023-12-15 | Stop reason: ALTCHOICE

## 2023-12-15 RX ORDER — AMOXICILLIN AND CLAVULANATE POTASSIUM 875; 125 MG/1; MG/1
875 TABLET, FILM COATED ORAL 2 TIMES DAILY
Qty: 20 TABLET | Refills: 0 | Status: SHIPPED | OUTPATIENT
Start: 2023-12-15 | End: 2023-12-25

## 2023-12-15 ASSESSMENT — ENCOUNTER SYMPTOMS
SINUS PAIN: 1
WHEEZING: 0
SORE THROAT: 1
FATIGUE: 1
DIARRHEA: 0
RHINORRHEA: 1
CHILLS: 0
COUGH: 1
VOMITING: 0
SHORTNESS OF BREATH: 0
APPETITE CHANGE: 1
ABDOMINAL PAIN: 0
FEVER: 0
SINUS PRESSURE: 1
NAUSEA: 0
MYALGIAS: 0
HEADACHES: 1

## 2023-12-15 NOTE — PROGRESS NOTES
Subjective   Patient ID: Magda Hough is a 48 y.o. female who presents for Sore Throat. Negative Covid tests at home.    Patient says that she was at a party last Saturday, 12/9. Two of the people there had COVID. She associated with these people at the party.     Patient says that she was here on 11/16/23 for an URI. She was treated with a zpack. Patient says that she feels like her symptoms improved by 80% but never went away fully. Since Sunday, patient had a sore throat. This has persisted and now patient has ear pressure. Patient feels foggy. Patient tested herself for COVID yesterday and this morning and it was negative both times. Patient has taken claritin and it did help. No chest pain or difficulty breathing.     Review of Systems   Constitutional:  Positive for appetite change and fatigue. Negative for chills and fever.   HENT:  Positive for congestion, rhinorrhea, sinus pressure, sinus pain and sore throat.    Respiratory:  Positive for cough. Negative for shortness of breath and wheezing.    Cardiovascular:  Negative for chest pain.   Gastrointestinal:  Negative for abdominal pain, diarrhea, nausea and vomiting.   Musculoskeletal:  Negative for myalgias.   Neurological:  Positive for headaches.     Objective   /78   Pulse 93   Temp 36.8 °C (98.3 °F)   Resp 16   Wt 59.9 kg (132 lb)   SpO2 99%   BMI 21.97 kg/m²     Physical Exam  Vitals reviewed.   Constitutional:       General: She is not in acute distress.     Appearance: Normal appearance. She is not ill-appearing.   HENT:      Head: Atraumatic.      Right Ear: Tympanic membrane, ear canal and external ear normal.      Left Ear: Tympanic membrane, ear canal and external ear normal.      Ears:      Comments: Serous effusion medial to TM      Nose: Congestion and rhinorrhea present.      Right Sinus: Maxillary sinus tenderness and frontal sinus tenderness present.      Left Sinus: Maxillary sinus tenderness and frontal sinus tenderness  present.      Mouth/Throat:      Mouth: Mucous membranes are moist.      Pharynx: Oropharynx is clear. Posterior oropharyngeal erythema present. No oropharyngeal exudate.      Comments: Tonsils normal, uvula midline  Eyes:      Conjunctiva/sclera: Conjunctivae normal.   Cardiovascular:      Rate and Rhythm: Normal rate and regular rhythm.      Heart sounds: Normal heart sounds. No murmur heard.  Pulmonary:      Effort: Pulmonary effort is normal.      Breath sounds: Normal breath sounds. No wheezing.   Skin:     General: Skin is warm and dry.   Neurological:      General: No focal deficit present.      Mental Status: She is alert.     Assessment/Plan   Problem List Items Addressed This Visit             ICD-10-CM    Sinobronchitis - Primary J32.9, J40    Relevant Medications    amoxicillin-pot clavulanate (Augmentin) 875-125 mg tablet    azelastine (Astelin) 137 mcg (0.1 %) nasal spray     Other Visit Diagnoses         Codes    Sore throat     J02.9    Relevant Orders    POCT Rapid Strep A manually resulted (Completed)    Group A Streptococcus, PCR          Rapid strep is negative. Will get back up strep testing. Pt declined need for COVID testing. Will start pt on augmentin and azestaline at this time. May use inhaler every four to six hours as needed for cough. Advised patient on use of humidifier and hot steam treatments. Discussed that patient is to drink plenty of fluids and stay well hydrated. Can take tylenol as needed for any fevers or discomfort. Discussed that patient is to go to the ER for any chest pain, difficulty breathing, shortness of breath or new/concerning symptoms; she agreed. Pt to follow up in 2-3 days if no better despite the use of the medications; she agreed.

## 2023-12-16 LAB — S PYO DNA THROAT QL NAA+PROBE: NOT DETECTED

## 2023-12-17 NOTE — RESULT ENCOUNTER NOTE
Please let pt know that strep pcr was negative. Please have her continue plan of care and follow up if no better

## 2023-12-18 ENCOUNTER — TELEMEDICINE (OUTPATIENT)
Dept: PRIMARY CARE | Facility: CLINIC | Age: 48
End: 2023-12-18
Payer: COMMERCIAL

## 2023-12-18 ENCOUNTER — HOSPITAL ENCOUNTER (EMERGENCY)
Facility: HOSPITAL | Age: 48
Discharge: HOME | End: 2023-12-18
Attending: EMERGENCY MEDICINE
Payer: COMMERCIAL

## 2023-12-18 VITALS
OXYGEN SATURATION: 100 % | WEIGHT: 132 LBS | DIASTOLIC BLOOD PRESSURE: 58 MMHG | BODY MASS INDEX: 21.99 KG/M2 | HEIGHT: 65 IN | SYSTOLIC BLOOD PRESSURE: 126 MMHG | HEART RATE: 83 BPM | RESPIRATION RATE: 16 BRPM | TEMPERATURE: 98.1 F

## 2023-12-18 DIAGNOSIS — B00.9 HSV INFECTION: Primary | ICD-10-CM

## 2023-12-18 DIAGNOSIS — K12.0 CANKER SORE: Primary | ICD-10-CM

## 2023-12-18 PROCEDURE — 2500000001 HC RX 250 WO HCPCS SELF ADMINISTERED DRUGS (ALT 637 FOR MEDICARE OP): Performed by: EMERGENCY MEDICINE

## 2023-12-18 PROCEDURE — 2500000004 HC RX 250 GENERAL PHARMACY W/ HCPCS (ALT 636 FOR OP/ED): Performed by: EMERGENCY MEDICINE

## 2023-12-18 PROCEDURE — 99283 EMERGENCY DEPT VISIT LOW MDM: CPT | Performed by: EMERGENCY MEDICINE

## 2023-12-18 PROCEDURE — 99212 OFFICE O/P EST SF 10 MIN: CPT | Performed by: NURSE PRACTITIONER

## 2023-12-18 RX ORDER — ACYCLOVIR 400 MG/1
400 TABLET ORAL 4 TIMES DAILY
Qty: 20 TABLET | Refills: 0 | Status: SHIPPED | OUTPATIENT
Start: 2023-12-18 | End: 2023-12-23

## 2023-12-18 RX ADMIN — DEXAMETHASONE 10 MG: 6 TABLET ORAL at 11:14

## 2023-12-18 RX ADMIN — TOPICAL ANESTHETIC 1 SPRAY: 200 SPRAY DENTAL; PERIODONTAL at 11:00

## 2023-12-18 ASSESSMENT — LIFESTYLE VARIABLES
HAVE YOU EVER FELT YOU SHOULD CUT DOWN ON YOUR DRINKING: NO
EVER FELT BAD OR GUILTY ABOUT YOUR DRINKING: NO
EVER HAD A DRINK FIRST THING IN THE MORNING TO STEADY YOUR NERVES TO GET RID OF A HANGOVER: NO
HAVE PEOPLE ANNOYED YOU BY CRITICIZING YOUR DRINKING: NO
REASON UNABLE TO ASSESS: NO

## 2023-12-18 ASSESSMENT — COLUMBIA-SUICIDE SEVERITY RATING SCALE - C-SSRS
2. HAVE YOU ACTUALLY HAD ANY THOUGHTS OF KILLING YOURSELF?: NO
1. IN THE PAST MONTH, HAVE YOU WISHED YOU WERE DEAD OR WISHED YOU COULD GO TO SLEEP AND NOT WAKE UP?: NO
6. HAVE YOU EVER DONE ANYTHING, STARTED TO DO ANYTHING, OR PREPARED TO DO ANYTHING TO END YOUR LIFE?: NO

## 2023-12-18 ASSESSMENT — PAIN - FUNCTIONAL ASSESSMENT: PAIN_FUNCTIONAL_ASSESSMENT: 0-10

## 2023-12-18 ASSESSMENT — PAIN SCALES - GENERAL: PAINLEVEL_OUTOF10: 8

## 2023-12-18 ASSESSMENT — PAIN DESCRIPTION - LOCATION: LOCATION: THROAT

## 2023-12-18 NOTE — ED PROVIDER NOTES
HPI   Chief Complaint   Patient presents with    Sore Throat     Pt with sore throat since . Pt took zpack and it got better. Pt currently on day 4 of augmentin with no relief. + sores noted in back of throat and on uvula.        48-year-old female present to the emerged part for evaluation of sore throat.  Patient reportedly had an of office visit on 12/15 and diagnosed with acute otitis media and started on Augmentin.  She is been taking this for the last 3 and half days.  Endorses sore throat.  Endorses mild odynophagia but denies any pain with range of motion of the neck, lymphadenopathy, fevers or chills.  Denies any vomiting or diarrhea.  She denies any history of ENT surgeries.  Does endorse prior history of sinus infection treated with azithromycin 1 month ago at urgent care.  She denies any sinus pain, tenderness to palpation of the teeth.  She denies any earache or change in hearing at this time.  No nasal discharge.                          Julio Coma Scale Score: 15                  Patient History   No past medical history on file.  Past Surgical History:   Procedure Laterality Date    OTHER SURGICAL HISTORY  2019     section    OTHER SURGICAL HISTORY  2020    Tubal ligation     Family History   Problem Relation Name Age of Onset    No Known Problems Mother      No Known Problems Father      No Known Problems Sister      Other (food allergy) Son      Other (Suoraventicular tachycardia by ECG) Son       Social History     Tobacco Use    Smoking status: Former     Packs/day: 0.25     Years: 5.00     Additional pack years: 0.00     Total pack years: 1.25     Types: Cigarettes     Quit date:      Years since quittin.9    Smokeless tobacco: Never   Substance Use Topics    Alcohol use: Yes     Comment: rare    Drug use: Never       Physical Exam   ED Triage Vitals [23 0945]   Temp Heart Rate Resp BP   36.5 °C (97.7 °F) 79 15 126/65      SpO2 Temp Source Heart Rate  Source Patient Position   100 % Temporal Monitor Sitting      BP Location FiO2 (%)     Right arm --       Physical Exam  Vitals and nursing note reviewed.   Constitutional:       General: She is not in acute distress.     Appearance: She is well-developed. She is not ill-appearing.   HENT:      Head: Normocephalic and atraumatic. No Castillo's sign, abrasion, contusion, right periorbital erythema or left periorbital erythema.      Jaw: There is normal jaw occlusion. No trismus, tenderness, pain on movement or malocclusion.      Salivary Glands: Right salivary gland is not diffusely enlarged or tender. Left salivary gland is not diffusely enlarged or tender.      Right Ear: Hearing, tympanic membrane, ear canal and external ear normal. No decreased hearing noted. No foreign body. No mastoid tenderness. No hemotympanum. Tympanic membrane is not injected, scarred, perforated, erythematous or bulging.      Left Ear: Hearing, tympanic membrane, ear canal and external ear normal. No decreased hearing noted. No foreign body. No mastoid tenderness. No hemotympanum. Tympanic membrane is not injected, scarred, perforated or erythematous. Tympanic membrane has normal mobility.      Nose: Nose normal. No nasal deformity, congestion or rhinorrhea.      Right Nostril: No foreign body, epistaxis or septal hematoma.      Left Nostril: No foreign body, epistaxis or septal hematoma.      Right Turbinates: Not enlarged, swollen or pale.      Left Turbinates: Not swollen or pale.      Right Sinus: No maxillary sinus tenderness or frontal sinus tenderness.      Left Sinus: No maxillary sinus tenderness or frontal sinus tenderness.      Mouth/Throat:      Lips: No lesions.      Mouth: Mucous membranes are moist. No injury, lacerations or angioedema.      Dentition: Normal dentition. Does not have dentures. No dental tenderness, gingival swelling, dental caries, dental abscesses or gum lesions.      Tongue: No lesions. Tongue does not  deviate from midline.      Pharynx: No pharyngeal swelling, oropharyngeal exudate, posterior oropharyngeal erythema or uvula swelling.      Tonsils: No tonsillar exudate or tonsillar abscesses. 0 on the right. 0 on the left.      Comments: Single ulcerated canker sore to the left side of the uvula.  Uvula midline.  No tonsil hypertrophy.  Full range of motion of the neck.  No cervical lymphadenopathy.  Normal phonation tolerance of oral secretions.  Eyes:      Conjunctiva/sclera: Conjunctivae normal.   Cardiovascular:      Rate and Rhythm: Normal rate and regular rhythm.      Pulses: Normal pulses.      Heart sounds: No murmur heard.     No gallop.   Pulmonary:      Effort: Pulmonary effort is normal. No respiratory distress.      Breath sounds: Normal breath sounds. No stridor. No wheezing, rhonchi or rales.   Abdominal:      General: Bowel sounds are normal. There is no distension.      Palpations: Abdomen is soft.      Tenderness: There is no abdominal tenderness. There is no guarding or rebound.   Musculoskeletal:         General: No swelling.      Cervical back: Neck supple.   Skin:     General: Skin is warm and dry.      Capillary Refill: Capillary refill takes less than 2 seconds.      Findings: No rash.   Neurological:      General: No focal deficit present.      Mental Status: She is alert and oriented to person, place, and time.      Cranial Nerves: No cranial nerve deficit.      Sensory: No sensory deficit.      Gait: Gait normal.   Psychiatric:         Mood and Affect: Mood normal.         Behavior: Behavior normal.         Thought Content: Thought content normal.         ED Course & MDM        Medical Decision Making  Well-appearing 48-year-old female presenting from home after telehealth visit this morning for further evaluation.  Appears to have a single canker sore on her uvula.  Does not seem consistent with a herpetic infection that would require antiviral medication.  She is already been utilizing  anti-inflammatories at home and will give a single dose of Decadron here in the emergency department.  She is already been tested for strep and was negative.  Despite this started on Augmentin for reported acute otitis media.  Ears are normal at this time with no sign of deep ENT infection otherwise.  Mild erythema the posterior oropharynx.  No sign of RPA or PTA.  Full range of motion the neck.  No meningismus symptoms.  No cervical lymphadenopathy.  No sign of mononucleosis, Lemierre's syndrome although these were all considered.  Patient advised on supportive care and PCP follow-up in 2 to 3 days recommended and referral as needed if not within 5 to 7 days per PCP discretion.  Advised to continue with significant fluid intake in the interim.  Advised to continue Augmentin until completion of previous course        Procedure  Procedures     Silvano Nelson,   12/18/23 1028

## 2023-12-18 NOTE — PATIENT INSTRUCTIONS
Thank you for seeing me today.  It was a pleasure to meet you!    #? HSV vs ABSCESS  I have sent a prescription for antiviral to your pharmacy  Unfortunately, I am unable to visualize your uvula during this ODVV, you should go to ED if it gets bigger, trouble swallowing, increased pain, fevers.    F/U WITH PCP AS NEEDED

## 2023-12-18 NOTE — PROGRESS NOTES
"Magda Hough is a 48 y.o. female who presents virtually today for a sick visit    Chief Complaint   Patient presents with    Sore Throat       Symptoms: SORE THROAT    Pt was seen and treated on 12/15/2023 per BELIA Roman NP with Augmentin X 10 days   Strep test---> negative  Covid test at home ----> negative    Pt states a steroid was mentioned at her appt and she feels like she may need that now.  She is still taking the Augmentin     **Pt reports that now she has a \"canker sore\" on her Uvula, and that this was not present at her appt on 12/15/23    Pt denies remote hx of HSV, last breakout was > 1 year ago     I am not able to see the back of her throat virtually d/t reflection of camera/light     She is talking, breathing and swallowing appropriately       Review of Systems  All 13 systems were reviewed and are within normal limits except positive and pertinent negative responses which are noted below or in HPI.        Objective   Vitals:  There were no vitals taken for this visit.        Physical Exam  Pulmonary:      Effort: Pulmonary effort is normal.   Neurological:      Mental Status: She is alert and oriented to person, place, and time.   Psychiatric:         Mood and Affect: Mood normal.         Behavior: Behavior normal.         Thought Content: Thought content normal.         Judgment: Judgment normal.         Assessment/Plan   Problem List Items Addressed This Visit    None  Visit Diagnoses         Codes    HSV infection    -  Primary B00.9    Relevant Medications    acyclovir (Zovirax) 400 mg tablet            "

## 2024-01-16 ENCOUNTER — APPOINTMENT (OUTPATIENT)
Dept: INTEGRATIVE MEDICINE | Facility: CLINIC | Age: 49
End: 2024-01-16
Payer: COMMERCIAL

## 2024-01-30 ENCOUNTER — ALLIED HEALTH (OUTPATIENT)
Dept: INTEGRATIVE MEDICINE | Facility: CLINIC | Age: 49
End: 2024-01-30
Payer: COMMERCIAL

## 2024-01-30 DIAGNOSIS — M99.00 SEGMENTAL AND SOMATIC DYSFUNCTION OF HEAD REGION: ICD-10-CM

## 2024-01-30 DIAGNOSIS — M99.01 SEGMENTAL AND SOMATIC DYSFUNCTION OF CERVICAL REGION: ICD-10-CM

## 2024-01-30 DIAGNOSIS — M53.3 SACRAL BACK PAIN: Primary | ICD-10-CM

## 2024-01-30 DIAGNOSIS — M99.02 SEGMENTAL AND SOMATIC DYSFUNCTION OF THORACIC REGION: ICD-10-CM

## 2024-01-30 DIAGNOSIS — M54.6 CHRONIC BILATERAL THORACIC BACK PAIN: ICD-10-CM

## 2024-01-30 DIAGNOSIS — G89.29 CHRONIC BILATERAL THORACIC BACK PAIN: ICD-10-CM

## 2024-01-30 DIAGNOSIS — M99.05 SEGMENTAL AND SOMATIC DYSFUNCTION OF PELVIC REGION: ICD-10-CM

## 2024-01-30 DIAGNOSIS — M99.03 SEGMENTAL AND SOMATIC DYSFUNCTION OF LUMBAR REGION: ICD-10-CM

## 2024-01-30 DIAGNOSIS — M54.2 NECK PAIN: ICD-10-CM

## 2024-01-30 DIAGNOSIS — M99.04 SEGMENTAL AND SOMATIC DYSFUNCTION OF SACRAL REGION: ICD-10-CM

## 2024-01-30 DIAGNOSIS — M79.9 POSTURAL STRAIN: ICD-10-CM

## 2024-01-30 DIAGNOSIS — M79.10 MYALGIA: ICD-10-CM

## 2024-01-30 PROCEDURE — 98942 CHIROPRACTIC MANJ 5 REGIONS: CPT | Performed by: CHIROPRACTOR

## 2024-01-30 PROCEDURE — 97140 MANUAL THERAPY 1/> REGIONS: CPT | Performed by: CHIROPRACTOR

## 2024-01-30 ASSESSMENT — ENCOUNTER SYMPTOMS
ABDOMINAL PAIN: 0
UNEXPECTED WEIGHT CHANGE: 0
EYE PAIN: 0
DIFFICULTY URINATING: 0
DIZZINESS: 0
APNEA: 0
VOMITING: 0
AGITATION: 0
CHILLS: 0

## 2024-01-30 NOTE — PROGRESS NOTES
Subjective   Patient ID: Magda Hough is a 48 y.o. female who presents today for the treatment of pain involving their L sacral pain, middle back stiffness.    This is visit 1 of the  calendar year. [place] (no limit)    Fall risk: None. No falls in the last 6 months.     HPI -today she would like to focus her treatment on the left lower back region.  She just got a new puppy which weighs approximately 20 pounds and has been lifting more than usual.     Patient describes the pain as stiffness, numbness, tingling, and dull, and rates the current pain 4 out of 10 (10 being the worst).     Last treatment 23: Overall no acute pain issues, she is still having L sacral soreness and tightness intermittently so this will be our focus of treatment today.   ________  ReEXAM 23: Magda has not been in for treatment since 2022. Therefore it is been approximately 4 months since our last treatment/encounter. She explains that about 4 to 6 weeks ago she started to notice a pain in the right side of her middle back near her right shoulder blade. This has gotten slightly worse over the last few weeks and she has intense pain with sneezing and certain movements. Occasionally she will feel tingling and numbness into the arm and feels as if there may be a pinched nerve. She has tried some self massage without being able to identify a specific location of pain. She is nervous that she may have a more serious condition but wanted to see if it was a musculoskeletal.  ________   INITIAL INTAKE/SUBJECTIVE 10/24/22: She was referred here by her primary care physician, Dr. Quiana Aleman at her most recent wellness visit in 2022. She explains that her lower back pain started approximately 8 years ago after she had her first child. She explains that she did have an epidural and a  section and is unsure if this was the trigger or onset of the back pain. She also had a miscarriage with a ruptured uterus  "which required extensive repair before having her second child about 4 years ago with another  section.     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 She was seeing his local chiropractor 2-3 times a week for quite some time and found that it did help her moderately however she felt that the massage helped even more. She also received x-rays around this timeframe which was a couple of years ago.     Today she has pain into primary locations the neck and upper back and the lower back and left glute and leg.There is no headaches currently and she does not report any significant numbness and tingling in the hands or feet. There is also no noticeable weakness or change in her gait etc. she additionally has pain in the left glute and left hamstring region. The used to be pain in the calf but this has since resolved.     She does not recall any specific physical trauma. She does deal with PTSD after a work issue. She is currently working with her therapist and her primary care physician taking Xanax as needed for panic attacks and Zoloft for her anxiety and depression. She also is currently being treated for ADHD.     Review of Systems   Constitutional:  Negative for chills and unexpected weight change.   HENT:  Negative for congestion and tinnitus.    Eyes:  Negative for pain.   Respiratory:  Negative for apnea.    Cardiovascular:  Negative for chest pain.   Gastrointestinal:  Negative for abdominal pain and vomiting.   Endocrine: Negative for cold intolerance and heat intolerance.   Genitourinary:  Negative for difficulty urinating and enuresis.        +    Allergic/Immunologic: Negative for immunocompromised state.   Neurological:  Negative for dizziness.   Psychiatric/Behavioral:  Negative for agitation.         + PTSD, " ADHD, anxiety and depression     Objective   Observation : normal gait and forward head posture    Physical Exam  Examination findings (palpation & ROM): Tenderness to palpation with hypertonicity throughout the bilateral upper trapezii and cervical/thoracic paraspinals as well as the L sacrum and lower back.     Segmental joint dysfunction was identified in the following areas using motion palpation and/or pain provocation assessment:  Cervical: C0-C3 (Supine)   Thoracic: T4-T11 (Prone)  Lumbar: L3-L5 (Side-lying)   Sacrum: L5/S1 and left SI (Drop)    Today's treatment:  Performed spinal manipulation to the regions of segmental dysfunction identified on examination using age-appropriate and injury specific force, and manual diversified technique. Technique Used: Diversified CMT and manual traction.    Manual Therapy (01380), Time In: 1:40 PM, Time Out: 1:55 PM, 1 Units. Ischemic compression and Soft-tissue manipulation.  Supine myofascial release to the neck and upper back. Side-lying IASTM and MFR to the L gluteal and sacral regions. Prone IC to the L thoracic/lumbar paraspinals, L SIJ, L glutes.      Treatment Plan:   The patient and I discussed the risks and benefits of chiropractic care. Based on the patient's subjective complaints along with the examination findings, it is advised that a course of chiropractic treatment by initiated. Consent for care was given both written and orally by the patient. The patient tolerated today's treatment with little or no additional discomfort and was instructed to contact the office for questions or concerns.     Treatment Frequency: Will see/treat patient every 2-4 weeks as therapeutic benefit is sustained, then frequency will be reduced to as needed for symptom management or as needed for acute flare-ups/exacerbation.     Please note: Voice-to-text software was used when completing this note.  While the note was proofread, portions may include grammatical errors.  Please  contact me with any questions/concerns as it relates to these types of errors.

## 2024-02-14 ENCOUNTER — ALLIED HEALTH (OUTPATIENT)
Dept: INTEGRATIVE MEDICINE | Facility: CLINIC | Age: 49
End: 2024-02-14
Payer: COMMERCIAL

## 2024-02-14 DIAGNOSIS — M79.9 POSTURAL STRAIN: ICD-10-CM

## 2024-02-14 DIAGNOSIS — M79.10 MYALGIA: ICD-10-CM

## 2024-02-14 DIAGNOSIS — G89.29 CHRONIC BILATERAL THORACIC BACK PAIN: Primary | ICD-10-CM

## 2024-02-14 DIAGNOSIS — M54.6 CHRONIC BILATERAL THORACIC BACK PAIN: Primary | ICD-10-CM

## 2024-02-14 DIAGNOSIS — M99.05 SEGMENTAL AND SOMATIC DYSFUNCTION OF PELVIC REGION: ICD-10-CM

## 2024-02-14 DIAGNOSIS — M99.03 SEGMENTAL AND SOMATIC DYSFUNCTION OF LUMBAR REGION: ICD-10-CM

## 2024-02-14 DIAGNOSIS — M99.02 SEGMENTAL AND SOMATIC DYSFUNCTION OF THORACIC REGION: ICD-10-CM

## 2024-02-14 DIAGNOSIS — M99.00 SEGMENTAL AND SOMATIC DYSFUNCTION OF HEAD REGION: ICD-10-CM

## 2024-02-14 DIAGNOSIS — M99.01 SEGMENTAL AND SOMATIC DYSFUNCTION OF CERVICAL REGION: ICD-10-CM

## 2024-02-14 DIAGNOSIS — M99.04 SEGMENTAL AND SOMATIC DYSFUNCTION OF SACRAL REGION: ICD-10-CM

## 2024-02-14 PROCEDURE — 97140 MANUAL THERAPY 1/> REGIONS: CPT | Performed by: CHIROPRACTOR

## 2024-02-14 PROCEDURE — 98942 CHIROPRACTIC MANJ 5 REGIONS: CPT | Performed by: CHIROPRACTOR

## 2024-02-14 ASSESSMENT — ENCOUNTER SYMPTOMS
ABDOMINAL PAIN: 0
VOMITING: 0
AGITATION: 0
APNEA: 0
DIFFICULTY URINATING: 0
CHILLS: 0
UNEXPECTED WEIGHT CHANGE: 0
DIZZINESS: 0
EYE PAIN: 0

## 2024-02-14 NOTE — PROGRESS NOTES
Subjective   Patient ID: Magda Hough is a 48 y.o. female who presents today for the treatment of pain involving their L sacral pain, middle back stiffness.    This is visit 2 of the 2024 calendar year. [place] (no limit)    Fall risk: None. No falls in the last 6 months.     HPI -today she is feeling most of her pain in the midthoracic/intrascapular region.  She suspects it is due to lifting her puppy a lot more.  She also did have a stay occasion where she was in a hotel room for night and was sleeping on a different bed and pillow than usual.  Will focus our soft tissue work on this area but check full spine for any additional restrictions.    Patient describes the pain as stiffness, numbness, tingling, and dull, and rates the current pain 4 out of 10 (10 being the worst).     Last treatment 1/30/24: today she would like to focus her treatment on the left lower back region.  She just got a new puppy which weighs approximately 20 pounds and has been lifting more than usual.     12/6/23: Overall no acute pain issues, she is still having L sacral soreness and tightness intermittently so this will be our focus of treatment today.   ________  ReEXAM 4/28/23: Magda has not been in for treatment since December 2022. Therefore it is been approximately 4 months since our last treatment/encounter. She explains that about 4 to 6 weeks ago she started to notice a pain in the right side of her middle back near her right shoulder blade. This has gotten slightly worse over the last few weeks and she has intense pain with sneezing and certain movements. Occasionally she will feel tingling and numbness into the arm and feels as if there may be a pinched nerve. She has tried some self massage without being able to identify a specific location of pain. She is nervous that she may have a more serious condition but wanted to see if it was a musculoskeletal.  ________   INITIAL INTAKE/SUBJECTIVE 10/24/22: She was referred here by her  "primary care physician, Dr. Quiana Aleman at her most recent wellness visit in 2022. She explains that her lower back pain started approximately 8 years ago after she had her first child. She explains that she did have an epidural and a  section and is unsure if this was the trigger or onset of the back pain. She also had a miscarriage with a ruptured uterus which required extensive repair before having her second child about 4 years ago with another  section.     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 She was seeing his local chiropractor 2-3 times a week for quite some time and found that it did help her moderately however she felt that the massage helped even more. She also received x-rays around this timeframe which was a couple of years ago.     Today she has pain into primary locations the neck and upper back and the lower back and left glute and leg.There is no headaches currently and she does not report any significant numbness and tingling in the hands or feet. There is also no noticeable weakness or change in her gait etc. she additionally has pain in the left glute and left hamstring region. The used to be pain in the calf but this has since resolved.     She does not recall any specific physical trauma. She does deal with PTSD after a work issue. She is currently working with her therapist and her primary care physician taking Xanax as needed for panic attacks and Zoloft for her anxiety and depression. She also is currently being treated for ADHD.     Review of Systems   Constitutional:  Negative for chills and unexpected weight change.   HENT:  Negative for congestion and tinnitus.    Eyes:  Negative for pain.   Respiratory:  Negative for apnea.    Cardiovascular:  Negative for chest pain. "   Gastrointestinal:  Negative for abdominal pain and vomiting.   Endocrine: Negative for cold intolerance and heat intolerance.   Genitourinary:  Negative for difficulty urinating and enuresis.        +    Allergic/Immunologic: Negative for immunocompromised state.   Neurological:  Negative for dizziness.   Psychiatric/Behavioral:  Negative for agitation.         + PTSD, ADHD, anxiety and depression     Objective   Observation : normal gait and forward head posture    Physical Exam  Examination findings (palpation & ROM): Tenderness to palpation with hypertonicity throughout the bilateral upper trapezii and cervical/thoracic paraspinals as well as the L sacrum and lower back.     Segmental joint dysfunction was identified in the following areas using motion palpation and/or pain provocation assessment:  Cervical: C0-C3 (Supine)   Thoracic: T4-T11 (Prone)  Lumbar: L3-L5 (Side-lying)   Sacrum: L5/S1 and left SI (Drop)    Today's treatment:  Performed spinal manipulation to the regions of segmental dysfunction identified on examination using age-appropriate and injury specific force, and manual diversified technique. Technique Used: Diversified CMT and manual traction.    Manual Therapy ([id number]), Time In: 11:20 AM, Time Out: 11:35 AM, 1 Units. Ischemic compression and Soft-tissue manipulation.  Supine myofascial release to the neck and upper back.  Prone myofascial release and instrument assisted soft tissue mobilization to the rhomboids, thoracic paraspinals middle and lower trapezius muscles..      Treatment Plan:   The patient and I discussed the risks and benefits of chiropractic care. Based on the patient's subjective complaints along with the examination findings, it is advised that a course of chiropractic treatment by initiated. Consent for care was given both written and orally by the patient. The patient tolerated today's treatment with little or no additional discomfort and was instructed to contact  the office for questions or concerns.     Treatment Frequency: Will see/treat patient every 2-4 weeks as therapeutic benefit is sustained, then frequency will be reduced to as needed for symptom management or as needed for acute flare-ups/exacerbation.     Please note: Voice-to-text software was used when completing this note.  While the note was proofread, portions may include grammatical errors.  Please contact me with any questions/concerns as it relates to these types of errors.

## 2024-02-28 ASSESSMENT — ENCOUNTER SYMPTOMS
CHILLS: 0
VOMITING: 0
EYE PAIN: 0
APNEA: 0
DIFFICULTY URINATING: 0
ABDOMINAL PAIN: 0
DIZZINESS: 0
AGITATION: 0
UNEXPECTED WEIGHT CHANGE: 0

## 2024-02-28 NOTE — PROGRESS NOTES
Subjective   Patient ID: Magda Hough is a 48 y.o. female who presents today for the treatment of pain involving their L sacral pain, middle back stiffness.    This is visit 3 of the 2024 calendar year. [place] (no limit)    Fall risk: None. No falls in the last 6 months.     HPI -today she would like to focus treatment on her lower back which has been slightly exacerbated.  Upon further questioning it is tighter on the right thoracolumbar region and the left SI joint regions.  She is unsure if it is due to helping her children build a set out of cardboard for an upcoming school play, lifting up there new puppy, or the fact that she has not been as diligent with her yoga practice in the last several months.  Will continue to work on the soft tissue regions and reducing intersegmental joint restrictions to see if this provides measurable improvement.  It was also encouraged that she really visit the yoga and try make this more of a priority to see if it will also help us maintain treatment benefit and avoid future exacerbations.    Last treatment 2/14/24: today she is feeling most of her pain in the midthoracic/intrascapular region.  She suspects it is due to lifting her puppy a lot more.  She also did have a stay occasion where she was in a hotel room for night and was sleeping on a different bed and pillow than usual.  Will focus our soft tissue work on this area but check full spine for any additional restrictions.    1/30/24: today she would like to focus her treatment on the left lower back region.  She just got a new puppy which weighs approximately 20 pounds and has been lifting more than usual.     12/6/23: Overall no acute pain issues, she is still having L sacral soreness and tightness intermittently so this will be our focus of treatment today.   ________  ReEXAM 4/28/23: Magda has not been in for treatment since December 2022. Therefore it is been approximately 4 months since our last treatment/encounter.  "She explains that about 4 to 6 weeks ago she started to notice a pain in the right side of her middle back near her right shoulder blade. This has gotten slightly worse over the last few weeks and she has intense pain with sneezing and certain movements. Occasionally she will feel tingling and numbness into the arm and feels as if there may be a pinched nerve. She has tried some self massage without being able to identify a specific location of pain. She is nervous that she may have a more serious condition but wanted to see if it was a musculoskeletal.  ________   INITIAL INTAKE/SUBJECTIVE 10/24/22: She was referred here by her primary care physician, Dr. Quiana Aleman at her most recent wellness visit in 2022. She explains that her lower back pain started approximately 8 years ago after she had her first child. She explains that she did have an epidural and a  section and is unsure if this was the trigger or onset of the back pain. She also had a miscarriage with a ruptured uterus which required extensive repair before having her second child about 4 years ago with another  section.     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 She was seeing his local chiropractor 2-3 times a week for quite some time and found that it did help her moderately however she felt that the massage helped even more. She also received x-rays around this timeframe which was a couple of years ago.     Today she has pain into primary locations the neck and upper back and the lower back and left glute and leg.There is no headaches currently and she does not report any significant numbness and tingling in the hands or feet. There is also no noticeable weakness or change in her gait etc. she additionally has pain in the left glute and " left hamstring region. The used to be pain in the calf but this has since resolved.     She does not recall any specific physical trauma. She does deal with PTSD after a work issue. She is currently working with her therapist and her primary care physician taking Xanax as needed for panic attacks and Zoloft for her anxiety and depression. She also is currently being treated for ADHD.     Review of Systems   Constitutional:  Negative for chills and unexpected weight change.   HENT:  Negative for congestion and tinnitus.    Eyes:  Negative for pain.   Respiratory:  Negative for apnea.    Cardiovascular:  Negative for chest pain.   Gastrointestinal:  Negative for abdominal pain and vomiting.   Endocrine: Negative for cold intolerance and heat intolerance.   Genitourinary:  Negative for difficulty urinating and enuresis.        +    Allergic/Immunologic: Negative for immunocompromised state.   Neurological:  Negative for dizziness.   Psychiatric/Behavioral:  Negative for agitation.         + PTSD, ADHD, anxiety and depression     Objective   Observation : normal gait and forward head posture    Physical Exam  Examination findings (palpation & ROM): Tenderness to palpation with hypertonicity throughout the bilateral upper trapezii and cervical/thoracic paraspinals as well as the L sacrum and lower back.     Segmental joint dysfunction was identified in the following areas using motion palpation and/or pain provocation assessment:  Cervical: C0-C3 (Supine)   Thoracic: T4-T11 (Prone)  Lumbar: L3-L5 (Side-lying)   Sacrum: L5/S1 and left SI (Drop)    Today's treatment:  Performed spinal manipulation to the regions of segmental dysfunction identified on examination using age-appropriate and injury specific force, and manual diversified technique. Technique Used: Diversified CMT and manual traction.    Manual Therapy ([id number]), Time In: 9:00 AM, Time Out: 9:15 AM, 1 Units. Ischemic compression and Soft-tissue  manipulation.  Prone myofascial release and instrument assisted soft tissue mobilization to the R thoracic and L lumbar paraspinals, L upper gluteals and L piriformis muscles. Brief supine myofascial release to the neck and upper back.      Treatment Plan:   The patient and I discussed the risks and benefits of chiropractic care. Based on the patient's subjective complaints along with the examination findings, it is advised that a course of chiropractic treatment by initiated. Consent for care was given both written and orally by the patient. The patient tolerated today's treatment with little or no additional discomfort and was instructed to contact the office for questions or concerns.     Treatment Frequency: Will see/treat patient every 2-4 weeks as therapeutic benefit is sustained, then frequency will be reduced to as needed for symptom management or as needed for acute flare-ups/exacerbation.     Please note: Voice-to-text software was used when completing this note.  While the note was proofread, portions may include grammatical errors.  Please contact me with any questions/concerns as it relates to these types of errors.

## 2024-02-29 ENCOUNTER — ALLIED HEALTH (OUTPATIENT)
Dept: INTEGRATIVE MEDICINE | Facility: CLINIC | Age: 49
End: 2024-02-29
Payer: COMMERCIAL

## 2024-02-29 DIAGNOSIS — M99.01 SEGMENTAL AND SOMATIC DYSFUNCTION OF CERVICAL REGION: ICD-10-CM

## 2024-02-29 DIAGNOSIS — G89.29 CHRONIC BILATERAL THORACIC BACK PAIN: Primary | ICD-10-CM

## 2024-02-29 DIAGNOSIS — M99.05 SEGMENTAL AND SOMATIC DYSFUNCTION OF PELVIC REGION: ICD-10-CM

## 2024-02-29 DIAGNOSIS — M79.9 POSTURAL STRAIN: ICD-10-CM

## 2024-02-29 DIAGNOSIS — M54.6 CHRONIC BILATERAL THORACIC BACK PAIN: Primary | ICD-10-CM

## 2024-02-29 DIAGNOSIS — M99.03 SEGMENTAL AND SOMATIC DYSFUNCTION OF LUMBAR REGION: ICD-10-CM

## 2024-02-29 DIAGNOSIS — M53.3 SACRAL BACK PAIN: ICD-10-CM

## 2024-02-29 DIAGNOSIS — M99.04 SEGMENTAL AND SOMATIC DYSFUNCTION OF SACRAL REGION: ICD-10-CM

## 2024-02-29 DIAGNOSIS — M99.02 SEGMENTAL AND SOMATIC DYSFUNCTION OF THORACIC REGION: ICD-10-CM

## 2024-02-29 DIAGNOSIS — M99.00 SEGMENTAL AND SOMATIC DYSFUNCTION OF HEAD REGION: ICD-10-CM

## 2024-02-29 DIAGNOSIS — M79.10 MYALGIA: ICD-10-CM

## 2024-02-29 PROCEDURE — 98942 CHIROPRACTIC MANJ 5 REGIONS: CPT | Performed by: CHIROPRACTOR

## 2024-02-29 PROCEDURE — 97140 MANUAL THERAPY 1/> REGIONS: CPT | Performed by: CHIROPRACTOR

## 2024-03-04 ENCOUNTER — APPOINTMENT (OUTPATIENT)
Dept: INTEGRATIVE MEDICINE | Facility: CLINIC | Age: 49
End: 2024-03-04
Payer: COMMERCIAL

## 2024-03-15 ENCOUNTER — ALLIED HEALTH (OUTPATIENT)
Dept: INTEGRATIVE MEDICINE | Facility: CLINIC | Age: 49
End: 2024-03-15
Payer: COMMERCIAL

## 2024-03-15 DIAGNOSIS — G89.29 CHRONIC BILATERAL THORACIC BACK PAIN: Primary | ICD-10-CM

## 2024-03-15 DIAGNOSIS — M54.6 CHRONIC BILATERAL THORACIC BACK PAIN: Primary | ICD-10-CM

## 2024-03-15 DIAGNOSIS — M99.00 SEGMENTAL AND SOMATIC DYSFUNCTION OF HEAD REGION: ICD-10-CM

## 2024-03-15 DIAGNOSIS — M54.2 NECK PAIN: ICD-10-CM

## 2024-03-15 DIAGNOSIS — M99.03 SEGMENTAL AND SOMATIC DYSFUNCTION OF LUMBAR REGION: ICD-10-CM

## 2024-03-15 DIAGNOSIS — M99.01 SEGMENTAL AND SOMATIC DYSFUNCTION OF CERVICAL REGION: ICD-10-CM

## 2024-03-15 DIAGNOSIS — M79.9 POSTURAL STRAIN: ICD-10-CM

## 2024-03-15 DIAGNOSIS — M53.3 SACRAL BACK PAIN: ICD-10-CM

## 2024-03-15 DIAGNOSIS — M99.05 SEGMENTAL AND SOMATIC DYSFUNCTION OF PELVIC REGION: ICD-10-CM

## 2024-03-15 DIAGNOSIS — M99.04 SEGMENTAL AND SOMATIC DYSFUNCTION OF SACRAL REGION: ICD-10-CM

## 2024-03-15 DIAGNOSIS — M99.02 SEGMENTAL AND SOMATIC DYSFUNCTION OF THORACIC REGION: ICD-10-CM

## 2024-03-15 DIAGNOSIS — M79.10 MYALGIA: ICD-10-CM

## 2024-03-15 PROCEDURE — 98942 CHIROPRACTIC MANJ 5 REGIONS: CPT | Performed by: CHIROPRACTOR

## 2024-03-15 ASSESSMENT — ENCOUNTER SYMPTOMS
AGITATION: 0
ABDOMINAL PAIN: 0
APNEA: 0
DIFFICULTY URINATING: 0
EYE PAIN: 0
UNEXPECTED WEIGHT CHANGE: 0
CHILLS: 0
VOMITING: 0
DIZZINESS: 0

## 2024-03-15 NOTE — PROGRESS NOTES
Subjective   Patient ID: Magda Hough is a 48 y.o. female who presents today for the treatment of pain involving their L sacral pain, middle back stiffness.    This is visit 4 of the 2024 calendar year. [place] (no limit)    Fall risk: None. No falls in the last 6 months.     HPI -she is doing quite well overall and has no specific pain issues today.  She was having some pain a week ago but suspects this may be related to her menstrual cycle.  She is starting into perimenopause and her cycle has been quite irregular.  She is very bloated and had a lot of pelvic and back pain but this seemed to self resolved after her cycle started.  She will start tracking this to see if there is in fact a correlation and trend.    Today she would like to do more of a maintenance type treatment checking full spine for any restrictions or imbalances that may lead to pain in the future.    Last treatment 2/29/24: today she would like to focus treatment on her lower back which has been slightly exacerbated.  Upon further questioning it is tighter on the right thoracolumbar region and the left SI joint regions.  She is unsure if it is due to helping her children build a set out of cardboard for an upcoming school play, lifting up there new puppy, or the fact that she has not been as diligent with her yoga practice in the last several months.  Will continue to work on the soft tissue regions and reducing intersegmental joint restrictions to see if this provides measurable improvement.  It was also encouraged that she really visit the yoga and try make this more of a priority to see if it will also help us maintain treatment benefit and avoid future exacerbations.    2/14/24: today she is feeling most of her pain in the midthoracic/intrascapular region.  She suspects it is due to lifting her puppy a lot more.  She also did have a stay occasion where she was in a hotel room for night and was sleeping on a different bed and pillow than  usual.  Will focus our soft tissue work on this area but check full spine for any additional restrictions.    24: today she would like to focus her treatment on the left lower back region.  She just got a new puppy which weighs approximately 20 pounds and has been lifting more than usual.     23: Overall no acute pain issues, she is still having L sacral soreness and tightness intermittently so this will be our focus of treatment today.   ________  ReEXAM 23: Magda has not been in for treatment since 2022. Therefore it is been approximately 4 months since our last treatment/encounter. She explains that about 4 to 6 weeks ago she started to notice a pain in the right side of her middle back near her right shoulder blade. This has gotten slightly worse over the last few weeks and she has intense pain with sneezing and certain movements. Occasionally she will feel tingling and numbness into the arm and feels as if there may be a pinched nerve. She has tried some self massage without being able to identify a specific location of pain. She is nervous that she may have a more serious condition but wanted to see if it was a musculoskeletal.  ________   INITIAL INTAKE/SUBJECTIVE 10/24/22: She was referred here by her primary care physician, Dr. Quiana Aleman at her most recent wellness visit in 2022. She explains that her lower back pain started approximately 8 years ago after she had her first child. She explains that she did have an epidural and a  section and is unsure if this was the trigger or onset of the back pain. She also had a miscarriage with a ruptured uterus which required extensive repair before having her second child about 4 years ago with another  section.     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 She was seeing his local chiropractor 2-3 times a week for quite some time and found that it did help her moderately however she felt that the massage helped even more. She also received x-rays around this timeframe which was a couple of years ago.     Today she has pain into primary locations the neck and upper back and the lower back and left glute and leg.There is no headaches currently and she does not report any significant numbness and tingling in the hands or feet. There is also no noticeable weakness or change in her gait etc. she additionally has pain in the left glute and left hamstring region. The used to be pain in the calf but this has since resolved.     She does not recall any specific physical trauma. She does deal with PTSD after a work issue. She is currently working with her therapist and her primary care physician taking Xanax as needed for panic attacks and Zoloft for her anxiety and depression. She also is currently being treated for ADHD.     Review of Systems   Constitutional:  Negative for chills and unexpected weight change.   HENT:  Negative for congestion and tinnitus.    Eyes:  Negative for pain.   Respiratory:  Negative for apnea.    Cardiovascular:  Negative for chest pain.   Gastrointestinal:  Negative for abdominal pain and vomiting.   Endocrine: Negative for cold intolerance and heat intolerance.   Genitourinary:  Negative for difficulty urinating and enuresis.        +    Allergic/Immunologic: Negative for immunocompromised state.   Neurological:  Negative for dizziness.   Psychiatric/Behavioral:  Negative for agitation.         + PTSD, ADHD, anxiety and depression     Objective   Observation : normal gait and forward head posture    Physical Exam  Examination findings (palpation & ROM): Mild tenderness to palpation with hypertonicity throughout the bilateral upper trapezii and cervical/thoracic paraspinals as well as the L sacrum and lower " back.     Segmental joint dysfunction was identified in the following areas using motion palpation and/or pain provocation assessment:  Cervical: C0-C3 (Supine)   Thoracic: T4-T11 (Prone)  Lumbar: L3-L5 (Side-lying)   Sacrum: L5/S1 and left SI (Drop)    Today's treatment:  Performed spinal manipulation to the regions of segmental dysfunction identified on examination using age-appropriate and injury specific force, and manual diversified technique. Technique Used: Diversified CMT and manual traction.    Brief supine myofascial release to the neck and upper back prior to manipulation (under 8 minutes).     Treatment Plan:   The patient and I discussed the risks and benefits of chiropractic care. Based on the patient's subjective complaints along with the examination findings, it is advised that a course of chiropractic treatment by initiated. Consent for care was given both written and orally by the patient. The patient tolerated today's treatment with little or no additional discomfort and was instructed to contact the office for questions or concerns.     Treatment Frequency: Will see/treat patient every 2-4 weeks as therapeutic benefit is sustained, then frequency will be reduced to as needed for symptom management or as needed for acute flare-ups/exacerbation.     Please note: Voice-to-text software was used when completing this note.  While the note was proofread, portions may include grammatical errors.  Please contact me with any questions/concerns as it relates to these types of errors.

## 2024-03-18 ENCOUNTER — APPOINTMENT (OUTPATIENT)
Dept: INTEGRATIVE MEDICINE | Facility: CLINIC | Age: 49
End: 2024-03-18
Payer: COMMERCIAL

## 2024-04-25 ENCOUNTER — HOSPITAL ENCOUNTER (EMERGENCY)
Facility: HOSPITAL | Age: 49
Discharge: HOME | End: 2024-04-25
Payer: COMMERCIAL

## 2024-04-25 ENCOUNTER — OFFICE VISIT (OUTPATIENT)
Dept: PRIMARY CARE | Facility: CLINIC | Age: 49
End: 2024-04-25
Payer: COMMERCIAL

## 2024-04-25 ENCOUNTER — APPOINTMENT (OUTPATIENT)
Dept: RADIOLOGY | Facility: HOSPITAL | Age: 49
End: 2024-04-25
Payer: COMMERCIAL

## 2024-04-25 VITALS
HEART RATE: 92 BPM | BODY MASS INDEX: 22.49 KG/M2 | RESPIRATION RATE: 18 BRPM | TEMPERATURE: 98.8 F | DIASTOLIC BLOOD PRESSURE: 66 MMHG | SYSTOLIC BLOOD PRESSURE: 115 MMHG | WEIGHT: 135 LBS | OXYGEN SATURATION: 98 % | HEIGHT: 65 IN

## 2024-04-25 VITALS
OXYGEN SATURATION: 98 % | RESPIRATION RATE: 20 BRPM | DIASTOLIC BLOOD PRESSURE: 75 MMHG | SYSTOLIC BLOOD PRESSURE: 110 MMHG | WEIGHT: 134.2 LBS | BODY MASS INDEX: 22.33 KG/M2 | HEART RATE: 85 BPM | TEMPERATURE: 98 F

## 2024-04-25 DIAGNOSIS — L03.213 PRESEPTAL CELLULITIS OF LEFT EYE: Primary | ICD-10-CM

## 2024-04-25 DIAGNOSIS — H00.015 HORDEOLUM EXTERNUM OF LEFT LOWER EYELID: Primary | ICD-10-CM

## 2024-04-25 LAB
ALBUMIN SERPL BCP-MCNC: 4.5 G/DL (ref 3.4–5)
ALP SERPL-CCNC: 61 U/L (ref 33–110)
ALT SERPL W P-5'-P-CCNC: 14 U/L (ref 7–45)
ANION GAP SERPL CALC-SCNC: 12 MMOL/L (ref 10–20)
AST SERPL W P-5'-P-CCNC: 18 U/L (ref 9–39)
BASOPHILS # BLD AUTO: 0.06 X10*3/UL (ref 0–0.1)
BASOPHILS NFR BLD AUTO: 1.4 %
BILIRUB SERPL-MCNC: 0.3 MG/DL (ref 0–1.2)
BUN SERPL-MCNC: 10 MG/DL (ref 6–23)
CALCIUM SERPL-MCNC: 9.8 MG/DL (ref 8.6–10.6)
CHLORIDE SERPL-SCNC: 101 MMOL/L (ref 98–107)
CO2 SERPL-SCNC: 31 MMOL/L (ref 21–32)
CREAT SERPL-MCNC: 0.72 MG/DL (ref 0.5–1.05)
EGFRCR SERPLBLD CKD-EPI 2021: >90 ML/MIN/1.73M*2
EOSINOPHIL # BLD AUTO: 0.13 X10*3/UL (ref 0–0.7)
EOSINOPHIL NFR BLD AUTO: 3 %
ERYTHROCYTE [DISTWIDTH] IN BLOOD BY AUTOMATED COUNT: 11.6 % (ref 11.5–14.5)
GLUCOSE SERPL-MCNC: 101 MG/DL (ref 74–99)
HCT VFR BLD AUTO: 42.1 % (ref 36–46)
HGB BLD-MCNC: 15 G/DL (ref 12–16)
IMM GRANULOCYTES # BLD AUTO: 0.02 X10*3/UL (ref 0–0.7)
IMM GRANULOCYTES NFR BLD AUTO: 0.5 % (ref 0–0.9)
LYMPHOCYTES # BLD AUTO: 1.18 X10*3/UL (ref 1.2–4.8)
LYMPHOCYTES NFR BLD AUTO: 26.8 %
MCH RBC QN AUTO: 30 PG (ref 26–34)
MCHC RBC AUTO-ENTMCNC: 35.6 G/DL (ref 32–36)
MCV RBC AUTO: 84 FL (ref 80–100)
MONOCYTES # BLD AUTO: 0.45 X10*3/UL (ref 0.1–1)
MONOCYTES NFR BLD AUTO: 10.2 %
NEUTROPHILS # BLD AUTO: 2.56 X10*3/UL (ref 1.2–7.7)
NEUTROPHILS NFR BLD AUTO: 58.1 %
NRBC BLD-RTO: 0 /100 WBCS (ref 0–0)
PLATELET # BLD AUTO: 244 X10*3/UL (ref 150–450)
POTASSIUM SERPL-SCNC: 3.5 MMOL/L (ref 3.5–5.3)
PROT SERPL-MCNC: 8 G/DL (ref 6.4–8.2)
RBC # BLD AUTO: 5 X10*6/UL (ref 4–5.2)
SODIUM SERPL-SCNC: 140 MMOL/L (ref 136–145)
WBC # BLD AUTO: 4.4 X10*3/UL (ref 4.4–11.3)

## 2024-04-25 PROCEDURE — 1036F TOBACCO NON-USER: CPT | Performed by: NURSE PRACTITIONER

## 2024-04-25 PROCEDURE — 99284 EMERGENCY DEPT VISIT MOD MDM: CPT | Mod: 25

## 2024-04-25 PROCEDURE — 36415 COLL VENOUS BLD VENIPUNCTURE: CPT | Performed by: PHYSICIAN ASSISTANT

## 2024-04-25 PROCEDURE — 96374 THER/PROPH/DIAG INJ IV PUSH: CPT | Mod: 59

## 2024-04-25 PROCEDURE — 2550000001 HC RX 255 CONTRASTS: Performed by: PHYSICIAN ASSISTANT

## 2024-04-25 PROCEDURE — 70487 CT MAXILLOFACIAL W/DYE: CPT

## 2024-04-25 PROCEDURE — 70487 CT MAXILLOFACIAL W/DYE: CPT | Performed by: STUDENT IN AN ORGANIZED HEALTH CARE EDUCATION/TRAINING PROGRAM

## 2024-04-25 PROCEDURE — 85025 COMPLETE CBC W/AUTO DIFF WBC: CPT | Performed by: PHYSICIAN ASSISTANT

## 2024-04-25 PROCEDURE — 99285 EMERGENCY DEPT VISIT HI MDM: CPT | Performed by: PHYSICIAN ASSISTANT

## 2024-04-25 PROCEDURE — 80053 COMPREHEN METABOLIC PANEL: CPT | Performed by: PHYSICIAN ASSISTANT

## 2024-04-25 PROCEDURE — 2500000004 HC RX 250 GENERAL PHARMACY W/ HCPCS (ALT 636 FOR OP/ED): Performed by: PHYSICIAN ASSISTANT

## 2024-04-25 PROCEDURE — 99214 OFFICE O/P EST MOD 30 MIN: CPT | Performed by: NURSE PRACTITIONER

## 2024-04-25 RX ORDER — SULFAMETHOXAZOLE AND TRIMETHOPRIM 800; 160 MG/1; MG/1
1 TABLET ORAL 2 TIMES DAILY
Qty: 28 TABLET | Refills: 0 | Status: SHIPPED | OUTPATIENT
Start: 2024-04-25 | End: 2024-05-09

## 2024-04-25 RX ORDER — KETOROLAC TROMETHAMINE 15 MG/ML
15 INJECTION, SOLUTION INTRAMUSCULAR; INTRAVENOUS ONCE
Status: COMPLETED | OUTPATIENT
Start: 2024-04-25 | End: 2024-04-25

## 2024-04-25 RX ORDER — ERYTHROMYCIN 5 MG/G
OINTMENT OPHTHALMIC 4 TIMES DAILY
Qty: 3.5 G | Refills: 0 | Status: SHIPPED | OUTPATIENT
Start: 2024-04-25 | End: 2024-05-02

## 2024-04-25 RX ORDER — AMOXICILLIN AND CLAVULANATE POTASSIUM 875; 125 MG/1; MG/1
875 TABLET, FILM COATED ORAL 2 TIMES DAILY
Qty: 20 TABLET | Refills: 0 | Status: SHIPPED | OUTPATIENT
Start: 2024-04-25 | End: 2024-05-05

## 2024-04-25 RX ADMIN — IOHEXOL 80 ML: 350 INJECTION, SOLUTION INTRAVENOUS at 19:50

## 2024-04-25 RX ADMIN — KETOROLAC TROMETHAMINE 15 MG: 15 INJECTION, SOLUTION INTRAMUSCULAR; INTRAVENOUS at 19:08

## 2024-04-25 ASSESSMENT — ENCOUNTER SYMPTOMS
EYE ITCHING: 0
EYE REDNESS: 0
RESPIRATORY NEGATIVE: 1
FEVER: 0
EYE DISCHARGE: 0
MUSCULOSKELETAL NEGATIVE: 1

## 2024-04-25 ASSESSMENT — COLUMBIA-SUICIDE SEVERITY RATING SCALE - C-SSRS
6. HAVE YOU EVER DONE ANYTHING, STARTED TO DO ANYTHING, OR PREPARED TO DO ANYTHING TO END YOUR LIFE?: NO
1. IN THE PAST MONTH, HAVE YOU WISHED YOU WERE DEAD OR WISHED YOU COULD GO TO SLEEP AND NOT WAKE UP?: NO
2. HAVE YOU ACTUALLY HAD ANY THOUGHTS OF KILLING YOURSELF?: NO

## 2024-04-25 NOTE — ED TRIAGE NOTES
Pt reports with poss left eye infection and was told the infection is possibly moving into her sinus. Pt was seen at her PCP today and was told to go to ED for ophtho consult. Denies fever, chills, N/V/D. Reports purulent drainage, BV in affected eye. Has reported previous infections for which she was admitted on IV ATB.

## 2024-04-25 NOTE — ED PROVIDER NOTES
"This is a 49-year-old female with a past medical history of eyelid abscess secondary to a stye who presents to the ED with a stye and concern for abscess again.  She states that she has had a small stye to her left lower eyelid for the past 1 week.  She states that it drained spontaneously yesterday.  She states that there was purulent fluid when it drained.  She states that she has had continued pain as well as swelling to the left cheek.  She states that she saw her primary care provider today who recommended getting a CT scan, however she would not be able to get the CT scan until tomorrow so her primary care provider told her she did go to the ED to have this performed due to concern for abscess.  She does endorse having some pain with extraocular eye movements.  She endorses some blurry vision to this left eye but denies any pain to the eye itself.  She states otherwise she has been in her normal state of health.  She denies any eye injury or trauma.  She denies any fevers or chills.  She was prescribed erythromycin ointment as well as Bactrim and Augmentin today at her primary care provider's office, however has not yet started the oral antibiotics.      History provided by:  Patient   used: No             Visit Vitals  /66   Pulse 92   Temp 37.1 °C (98.8 °F)   Resp 18   Ht 1.651 m (5' 5\")   Wt 61.2 kg (135 lb)   SpO2 98%   BMI 22.47 kg/m²   Smoking Status Former   BSA 1.68 m²          Physical Exam     Physical exam:   General: Vitals noted, no distress. Afebrile.   EENT:  Hearing grossly intact. Normal phonation. MMM. Airway patient. PERRL. EOMI. Small stye that is exquisitely tender to the medial aspect of the left lower eyelid with associated soft tissue swelling to the left cheek.  There is mild tenderness palpation to the left cheek without any areas of fluctuance.  No abnormalities noted to the eye itself, specifically no conjunctival injection.  Neck: No midline tenderness or " paraspinal tenderness. FROM.   Cardiac: Regular, rate, rhythm. Normal S1 and S2.  No murmurs, gallops, rubs.   Pulmonary: Good air exchange. Lungs clear bilaterally. No wheezes, rhonchi, rales. No accessory muscle use.   Extremities: No peripheral edema.  Full range of motion. Moves all extremities freely.   Skin: No rash. Warm and Dry.   Neuro: No focal neurologic deficits. CN 2-12 grossly intact. Sensation equal bilaterally. No weakness.         Labs Reviewed   CBC WITH AUTO DIFFERENTIAL - Abnormal       Result Value    WBC 4.4      nRBC 0.0      RBC 5.00      Hemoglobin 15.0      Hematocrit 42.1      MCV 84      MCH 30.0      MCHC 35.6      RDW 11.6      Platelets 244      Neutrophils % 58.1      Immature Granulocytes %, Automated 0.5      Lymphocytes % 26.8      Monocytes % 10.2      Eosinophils % 3.0      Basophils % 1.4      Neutrophils Absolute 2.56      Immature Granulocytes Absolute, Automated 0.02      Lymphocytes Absolute 1.18 (*)     Monocytes Absolute 0.45      Eosinophils Absolute 0.13      Basophils Absolute 0.06     COMPREHENSIVE METABOLIC PANEL - Abnormal    Glucose 101 (*)     Sodium 140      Potassium 3.5      Chloride 101      Bicarbonate 31      Anion Gap 12      Urea Nitrogen 10      Creatinine 0.72      eGFR >90      Calcium 9.8      Albumin 4.5      Alkaline Phosphatase 61      Total Protein 8.0      AST 18      Bilirubin, Total 0.3      ALT 14         CT facial bones w IV contrast           CT facial bones w IV contrast  Status: Preliminary result  PACS Images - [id number]   Show images for CT facial bones w IV contrast  Exam Information  Status Exam Begun Exam Ended  Wet Read 4/25/2024 19:39 4/25/2024 19:53  Study Result  Narrative & Impression  STUDY:  CT MAXILLOFACIAL BONES W IV CONTRAST;  4/25/2024 7:53 pm      INDICATION:  Signs/Symptoms:Stye to left lower eyelid.  New pain extraocular eye  movements.  Pain to left side of face with associated soft tissue  swelling..      COMPARISON:  None.       ACCESSION NUMBER(S):  [id number]      ORDERING CLINICIAN:  MAURILIO HAYWARD      TECHNIQUE:  Axial CT images of facial bones after the administration of 80 mL  Omnipaque 350 IV contrast with coronal and sagittal reformatted  images .      FINDINGS:  FACIAL BONES: No acute facial bone fracture. The bony orbits are  intact.      ORBITS: The globes, extraocular muscles, and optic nerve sheath  complexes are intact. No retrobulbar hematoma.      SOFT TISSUES: Mild soft tissue swelling and skin thickening involving  the left lower eyelid without evidence of abscess or fluid  collection. Trace infraorbital soft tissue swelling.      PARANASAL SINUSES: No hemorrhage or fluid levels in the paranasal  sinuses. The paranasal sinuses are clear.      MASTOIDS: Well-aerated.      INTRACRANIAL STRUCTURES: The partially visualized intracranial  structures show no acute abnormality.      OTHER FINDINGS: Few dental screws and dental hardware are noted  including in tooth 21. There is a tiny periapical lucency involving  tooth 21 (series 206, image 54).      IMPRESSION:  1. Mild soft tissue swelling and skin thickening involving the left  lower eyelid without evidence of abscess or fluid collection. Trace  infraorbital soft tissue swelling.      I personally reviewed the images/study and I agree with the findings  as stated by Tru Orellana MD. This study was interpreted at  [facility], [place], [place].      MACRO:    ED Course & MDM     Medical Decision Making  This is a 49-year-old female with past medical history of a stye with associated abscess requiring IV antibiotics and drainage who presents to the ED with a stye with concern for abscess again.  Vital stable upon arrival to the ED.  On examination patient did have a small stye to her left lower eyelid that was exquisitely tender to touch.  There was mild associated soft tissue swelling and tenderness palpation of the left cheek.  No  other areas of tenderness.  Extraocular eye movements intact with some discomfort.  No abnormalities noted to the eye itself.  Patient's visit to her primary care provider's office today was reviewed.  CT face as well as laboratory studies ordered.  Patient medicated with Toradol for her pain.  On reevaluation she is feeling well.  Laboratory studies overall grossly unremarkable.  CT scan showed no evidence of abscess or postseptal cellulitis at this time.  There is very mild soft tissue swelling around the left lower eyelid.  Patient is already prescribed Bactrim and Augmentin as well as topical erythromycin and was advised to continue these medications.  She is advised to follow-up close with her primary care provider as well as ophthalmology.  She was given a note for her job and was given strict return precautions.  She was agreeable to this plan and had no further questions at time of discharge.    Amount and/or Complexity of Data Reviewed  Labs: ordered.  Radiology: ordered and independent interpretation performed.     Details: CT face without any visualized fluid collections.         Diagnoses as of 04/25/24 2145   Preseptal cellulitis of left eye       Procedures    TAMI Foster, PAEriC     Isabel Lindsey PA-C  04/25/24 2145

## 2024-04-25 NOTE — PROGRESS NOTES
Subjective   Patient ID: Magda Hough is a 49 y.o. female who presents for Stye (Sinuses are now possibly infected). Started Saturday and progressively changing.    Patient has a history of stye in the left eye (1/2020) that was not responding to antibiotic eyedrops or oral medications. Pt was being seen at [facility] in [place] and was referred to [facility], where they did a CT scan of her facial bones and they said she had an abscess. Pt was admitted into the hospital for two days of IV antibiotics and followed up with ophthalmology who drained the stye.     Patient reports that on Sunday, she noted the stye on her left eye again. She says that it did drain and there was some purulent drainage. Pt has noted progressive facial pain since then. She says that on Sunday, she was holding the area under the eye because she feels pressure. Her left maxillary sinus hurts when she lays down          Review of Systems   Constitutional:  Negative for fever.   HENT: Negative.     Eyes:  Negative for discharge, redness, itching and visual disturbance.        Stye   Respiratory: Negative.     Musculoskeletal: Negative.      Objective   /75   Pulse 85   Temp 36.7 °C (98 °F)   Resp 20   Wt 60.9 kg (134 lb 3.2 oz)   SpO2 98%   BMI 22.33 kg/m²     Physical Exam  Vitals reviewed.   Constitutional:       General: She is not in acute distress.     Appearance: Normal appearance. She is not ill-appearing or toxic-appearing.   HENT:      Head: Atraumatic.      Comments: Pt's left side of the face appears swollen   Eyes:      General:         Right eye: No discharge or hordeolum.         Left eye: Hordeolum present.No discharge.      Extraocular Movements: Extraocular movements intact.      Right eye: Normal extraocular motion.      Left eye: Normal extraocular motion.      Pupils: Pupils are equal, round, and reactive to light.        Comments: The area outlined is where the external hordeolum can be seen.  There is no discharge or drainage    Cardiovascular:      Rate and Rhythm: Normal rate and regular rhythm.      Heart sounds: Normal heart sounds. No murmur heard.  Pulmonary:      Effort: Pulmonary effort is normal.      Breath sounds: Normal breath sounds. No wheezing or rhonchi.   Skin:     General: Skin is warm and dry.   Neurological:      General: No focal deficit present.      Mental Status: She is alert.   Psychiatric:         Mood and Affect: Mood normal.     Assessment/Plan   Problem List Items Addressed This Visit    None  Visit Diagnoses         Codes    Hordeolum externum of left lower eyelid    -  Primary H00.015    Relevant Medications    erythromycin (Romycin) 5 mg/gram (0.5 %) ophthalmic ointment    amoxicillin-pot clavulanate (Augmentin) 875-125 mg tablet    sulfamethoxazole-trimethoprim (Bactrim DS) 800-160 mg tablet    Other Relevant Orders    CT facial bones w IV contrast    CBC and Auto Differential    Comprehensive Metabolic Panel        Ordered labs, STAT CT for patient. Also ordered topical and oral medications for the patient. Unfortunately, the STAT CT cannot be done until tomorrow morning at 8:45 am. I ordered STAT labs and put in orders for oral medications. Patient is not sure if she will wait until tomorrow to have the CT scan. She may to go to the ER tonight. I advised that patient is not to wait to go to the ER for any worsening eye pain/sinus pressure, fevers or new/concerning symptoms; she agreed. Will follow up with pt.

## 2024-04-25 NOTE — Clinical Note
Magda Hough was seen and treated in our emergency department on 4/25/2024.  She may return to work on 04/27/2024.       If you have any questions or concerns, please don't hesitate to call.      Isabel Lindsey PA-C

## 2024-04-26 ENCOUNTER — HOSPITAL ENCOUNTER (OUTPATIENT)
Dept: RADIOLOGY | Facility: CLINIC | Age: 49
End: 2024-04-26
Payer: COMMERCIAL

## 2024-04-26 ENCOUNTER — APPOINTMENT (OUTPATIENT)
Dept: PRIMARY CARE | Facility: CLINIC | Age: 49
End: 2024-04-26
Payer: COMMERCIAL

## 2024-04-26 ENCOUNTER — TELEPHONE (OUTPATIENT)
Dept: PRIMARY CARE | Facility: CLINIC | Age: 49
End: 2024-04-26

## 2024-04-26 NOTE — TELEPHONE ENCOUNTER
Spoke to patient and advised that she has an appt today at 11:15 am at the [facility] in [place]. Patient will be present. She started using the eye ointment and is already feeling better. No further questions per pt

## 2024-04-29 ENCOUNTER — ALLIED HEALTH (OUTPATIENT)
Dept: INTEGRATIVE MEDICINE | Facility: CLINIC | Age: 49
End: 2024-04-29
Payer: COMMERCIAL

## 2024-04-29 DIAGNOSIS — M99.01 SEGMENTAL AND SOMATIC DYSFUNCTION OF CERVICAL REGION: ICD-10-CM

## 2024-04-29 DIAGNOSIS — M99.03 SEGMENTAL AND SOMATIC DYSFUNCTION OF LUMBAR REGION: ICD-10-CM

## 2024-04-29 DIAGNOSIS — M99.00 SEGMENTAL AND SOMATIC DYSFUNCTION OF HEAD REGION: ICD-10-CM

## 2024-04-29 DIAGNOSIS — M54.2 NECK PAIN: ICD-10-CM

## 2024-04-29 DIAGNOSIS — M99.05 SEGMENTAL AND SOMATIC DYSFUNCTION OF PELVIC REGION: ICD-10-CM

## 2024-04-29 DIAGNOSIS — M54.50 CHRONIC BILATERAL LOW BACK PAIN WITHOUT SCIATICA: ICD-10-CM

## 2024-04-29 DIAGNOSIS — M79.10 MYALGIA: ICD-10-CM

## 2024-04-29 DIAGNOSIS — M53.3 SACRAL BACK PAIN: Primary | ICD-10-CM

## 2024-04-29 DIAGNOSIS — M79.9 POSTURAL STRAIN: ICD-10-CM

## 2024-04-29 DIAGNOSIS — M54.6 CHRONIC BILATERAL THORACIC BACK PAIN: ICD-10-CM

## 2024-04-29 DIAGNOSIS — G89.29 CHRONIC BILATERAL THORACIC BACK PAIN: ICD-10-CM

## 2024-04-29 DIAGNOSIS — M99.04 SEGMENTAL AND SOMATIC DYSFUNCTION OF SACRAL REGION: ICD-10-CM

## 2024-04-29 DIAGNOSIS — M99.02 SEGMENTAL AND SOMATIC DYSFUNCTION OF THORACIC REGION: ICD-10-CM

## 2024-04-29 DIAGNOSIS — G89.29 CHRONIC BILATERAL LOW BACK PAIN WITHOUT SCIATICA: ICD-10-CM

## 2024-04-29 PROCEDURE — 97140 MANUAL THERAPY 1/> REGIONS: CPT | Performed by: CHIROPRACTOR

## 2024-04-29 PROCEDURE — 98942 CHIROPRACTIC MANJ 5 REGIONS: CPT | Performed by: CHIROPRACTOR

## 2024-04-29 ASSESSMENT — ENCOUNTER SYMPTOMS
UNEXPECTED WEIGHT CHANGE: 0
AGITATION: 0
VOMITING: 0
APNEA: 0
DIFFICULTY URINATING: 0
CHILLS: 0
DIZZINESS: 0
EYE PAIN: 0
ABDOMINAL PAIN: 0

## 2024-04-29 NOTE — PROGRESS NOTES
Subjective   Patient ID: Magda Hough is a 49 y.o. female who presents today for the treatment of pain involving their L sacral pain, middle back stiffness.    This is visit 5 of the 2024 calendar year. [place] (no limit)    Fall risk: None. No falls in the last 6 months.     HPI -no acute pain today but she is having pain across the entire lumbar and lumbosacral region.  Will focus treatment on this area but check full spine for any additional restrictions or imbalances.  She mentions that she is little more sore due to carrying her son who is approximately 40 pounds.  He injured his knee following and therefore she is needed to lift and carry more frequently than usual.  He is back to full weightbearing and therefore this should no longer be an issue moving forward.    Last treatment 3/15/24: she is doing quite well overall and has no specific pain issues today.  She was having some pain a week ago but suspects this may be related to her menstrual cycle.  She is starting into perimenopause and her cycle has been quite irregular.  She is very bloated and had a lot of pelvic and back pain but this seemed to self resolved after her cycle started.  She will start tracking this to see if there is in fact a correlation and trend.    Today she would like to do more of a maintenance type treatment checking full spine for any restrictions or imbalances that may lead to pain in the future.    2/29/24: today she would like to focus treatment on her lower back which has been slightly exacerbated.  Upon further questioning it is tighter on the right thoracolumbar region and the left SI joint regions.  She is unsure if it is due to helping her children build a set out of cardboard for an upcoming school play, lifting up there new puppy, or the fact that she has not been as diligent with her yoga practice in the last several months.  Will continue to work on the soft tissue regions and reducing intersegmental joint restrictions  to see if this provides measurable improvement.  It was also encouraged that she really visit the yoga and try make this more of a priority to see if it will also help us maintain treatment benefit and avoid future exacerbations.    2/14/24: today she is feeling most of her pain in the midthoracic/intrascapular region.  She suspects it is due to lifting her puppy a lot more.  She also did have a stay occasion where she was in a hotel room for night and was sleeping on a different bed and pillow than usual.  Will focus our soft tissue work on this area but check full spine for any additional restrictions.    1/30/24: today she would like to focus her treatment on the left lower back region.  She just got a new puppy which weighs approximately 20 pounds and has been lifting more than usual.     12/6/23: Overall no acute pain issues, she is still having L sacral soreness and tightness intermittently so this will be our focus of treatment today.   ________  ReEXAM 4/28/23: Magda has not been in for treatment since December 2022. Therefore it is been approximately 4 months since our last treatment/encounter. She explains that about 4 to 6 weeks ago she started to notice a pain in the right side of her middle back near her right shoulder blade. This has gotten slightly worse over the last few weeks and she has intense pain with sneezing and certain movements. Occasionally she will feel tingling and numbness into the arm and feels as if there may be a pinched nerve. She has tried some self massage without being able to identify a specific location of pain. She is nervous that she may have a more serious condition but wanted to see if it was a musculoskeletal.  ________   INITIAL INTAKE/SUBJECTIVE 10/24/22: She was referred here by her primary care physician, Dr. Quiana Aleman at her most recent wellness visit in August 2022. She explains that her lower back pain started approximately 8 years ago after she had her first  "child. She explains that she did have an epidural and a  section and is unsure if this was the trigger or onset of the back pain. She also had a miscarriage with a ruptured uterus which required extensive repair before having her second child about 4 years ago with another  section.     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 She was seeing his local chiropractor 2-3 times a week for quite some time and found that it did help her moderately however she felt that the massage helped even more. She also received x-rays around this timeframe which was a couple of years ago.     Today she has pain into primary locations the neck and upper back and the lower back and left glute and leg.There is no headaches currently and she does not report any significant numbness and tingling in the hands or feet. There is also no noticeable weakness or change in her gait etc. she additionally has pain in the left glute and left hamstring region. The used to be pain in the calf but this has since resolved.     She does not recall any specific physical trauma. She does deal with PTSD after a work issue. She is currently working with her therapist and her primary care physician taking Xanax as needed for panic attacks and Zoloft for her anxiety and depression. She also is currently being treated for ADHD.     Review of Systems   Constitutional:  Negative for chills and unexpected weight change.   HENT:  Negative for congestion and tinnitus.    Eyes:  Negative for pain.   Respiratory:  Negative for apnea.    Cardiovascular:  Negative for chest pain.   Gastrointestinal:  Negative for abdominal pain and vomiting.   Endocrine: Negative for cold intolerance and heat intolerance.   Genitourinary:  Negative for difficulty urinating and enuresis.     "    +    Allergic/Immunologic: Negative for immunocompromised state.   Neurological:  Negative for dizziness.   Psychiatric/Behavioral:  Negative for agitation.         + PTSD, ADHD, anxiety and depression     Objective   Observation : normal gait and forward head posture    Physical Exam  Examination findings (palpation & ROM): Tenderness, hypertonicity and trigger points palpated throughout the bilateral quadratus lumborum, bilateral thoracic/lower lumbar paraspinals, bilateral SI joints.  Mild tenderness to palpation with hypertonicity throughout the bilateral upper trapezii and cervical/upper thoracic paraspinals.     Segmental joint dysfunction was identified in the following areas using motion palpation and/or pain provocation assessment:  Cervical: C0-C3 (Supine)   Thoracic: T4-T11 (Prone)  Lumbar: L3-L5 (Side-lying)   Sacrum: L5/S1 and left SI (Drop)    Today's treatment:  Performed spinal manipulation to the regions of segmental dysfunction identified on examination using age-appropriate and injury specific force, and manual diversified technique. Technique Used: Diversified CMT and manual traction.    Performed soft tissue manipulation including IASTM (instrument assisted soft tissue mobilization), MFR (Myofacial Release) and IC (ischemic compression) to the hypertonic paraspinal muscles including bilateral quadratus lumborum, bilateral thoracic/lower lumbar paraspinals, bilateral SI joint to patient tolerance (Start time 11:20 AM, End Time 11:35 AM). Cryoderm applied afterward.     Treatment Plan:   The patient and I discussed the risks and benefits of chiropractic care. Based on the patient's subjective complaints along with the examination findings, it is advised that a course of chiropractic treatment by initiated. Consent for care was given both written and orally by the patient. The patient tolerated today's treatment with little or no additional discomfort and was instructed to contact the  office for questions or concerns.     Treatment Frequency: Will see/treat patient every 2-4 weeks as therapeutic benefit is sustained, then frequency will be reduced to as needed for symptom management or as needed for acute flare-ups/exacerbation.     Please note: Voice-to-text software was used when completing this note.  While the note was proofread, portions may include grammatical errors.  Please contact me with any questions/concerns as it relates to these types of errors.

## 2024-05-08 ENCOUNTER — ALLIED HEALTH (OUTPATIENT)
Dept: INTEGRATIVE MEDICINE | Facility: CLINIC | Age: 49
End: 2024-05-08
Payer: COMMERCIAL

## 2024-05-08 DIAGNOSIS — M79.9 POSTURAL STRAIN: ICD-10-CM

## 2024-05-08 DIAGNOSIS — M99.01 SEGMENTAL AND SOMATIC DYSFUNCTION OF CERVICAL REGION: ICD-10-CM

## 2024-05-08 DIAGNOSIS — G89.29 CHRONIC BILATERAL THORACIC BACK PAIN: Primary | ICD-10-CM

## 2024-05-08 DIAGNOSIS — M54.2 NECK PAIN: ICD-10-CM

## 2024-05-08 DIAGNOSIS — M99.00 SEGMENTAL AND SOMATIC DYSFUNCTION OF HEAD REGION: ICD-10-CM

## 2024-05-08 DIAGNOSIS — G89.29 CHRONIC BILATERAL LOW BACK PAIN WITHOUT SCIATICA: ICD-10-CM

## 2024-05-08 DIAGNOSIS — M99.02 SEGMENTAL AND SOMATIC DYSFUNCTION OF THORACIC REGION: ICD-10-CM

## 2024-05-08 DIAGNOSIS — M99.03 SEGMENTAL AND SOMATIC DYSFUNCTION OF LUMBAR REGION: ICD-10-CM

## 2024-05-08 DIAGNOSIS — M54.50 CHRONIC BILATERAL LOW BACK PAIN WITHOUT SCIATICA: ICD-10-CM

## 2024-05-08 DIAGNOSIS — M79.10 MYALGIA: ICD-10-CM

## 2024-05-08 DIAGNOSIS — M54.6 CHRONIC BILATERAL THORACIC BACK PAIN: Primary | ICD-10-CM

## 2024-05-08 PROCEDURE — 98941 CHIROPRACT MANJ 3-4 REGIONS: CPT | Performed by: CHIROPRACTOR

## 2024-05-08 PROCEDURE — 97140 MANUAL THERAPY 1/> REGIONS: CPT | Performed by: CHIROPRACTOR

## 2024-05-08 ASSESSMENT — ENCOUNTER SYMPTOMS
DIFFICULTY URINATING: 0
VOMITING: 0
CHILLS: 0
ABDOMINAL PAIN: 0
DIZZINESS: 0
UNEXPECTED WEIGHT CHANGE: 0
APNEA: 0
EYE PAIN: 0
AGITATION: 0

## 2024-05-08 NOTE — PROGRESS NOTES
"Subjective   Patient ID: Magda Hough is a 49 y.o. female who presents today for the treatment of pain involving their L sacral pain, middle back stiffness.    This is visit 6 of the 2024 calendar year. [place] (no limit)    Fall risk: None. No falls in the last 6 months.     HPI -the mid scapular/thoracic region is her chief complaint today.  On Sunday it was so bad she was having her kids walk on her back and her  tried to give her an adjustment.  She did get a little \"crack\" which did help temporarily.  Today she would like to focus treatment on this area again but check full spine for any additional restrictions or imbalances.    Last treatment 4/29/24: no acute pain today but she is having pain across the entire lumbar and lumbosacral region.  Will focus treatment on this area but check full spine for any additional restrictions or imbalances.  She mentions that she is little more sore due to carrying her son who is approximately 40 pounds.  He injured his knee following and therefore she is needed to lift and carry more frequently than usual.  He is back to full weightbearing and therefore this should no longer be an issue moving forward.    3/15/24: she is doing quite well overall and has no specific pain issues today.  She was having some pain a week ago but suspects this may be related to her menstrual cycle.  She is starting into perimenopause and her cycle has been quite irregular.  She is very bloated and had a lot of pelvic and back pain but this seemed to self resolved after her cycle started.  She will start tracking this to see if there is in fact a correlation and trend.    Today she would like to do more of a maintenance type treatment checking full spine for any restrictions or imbalances that may lead to pain in the future.    2/29/24: today she would like to focus treatment on her lower back which has been slightly exacerbated.  Upon further questioning it is tighter on the right " thoracolumbar region and the left SI joint regions.  She is unsure if it is due to helping her children build a set out of cardboard for an upcoming school play, lifting up there new puppy, or the fact that she has not been as diligent with her yoga practice in the last several months.  Will continue to work on the soft tissue regions and reducing intersegmental joint restrictions to see if this provides measurable improvement.  It was also encouraged that she really visit the yoga and try make this more of a priority to see if it will also help us maintain treatment benefit and avoid future exacerbations.    2/14/24: today she is feeling most of her pain in the midthoracic/intrascapular region.  She suspects it is due to lifting her puppy a lot more.  She also did have a stay occasion where she was in a hotel room for night and was sleeping on a different bed and pillow than usual.  Will focus our soft tissue work on this area but check full spine for any additional restrictions.    1/30/24: today she would like to focus her treatment on the left lower back region.  She just got a new puppy which weighs approximately 20 pounds and has been lifting more than usual.     12/6/23: Overall no acute pain issues, she is still having L sacral soreness and tightness intermittently so this will be our focus of treatment today.   ________  ReEXAM 4/28/23: Magda has not been in for treatment since December 2022. Therefore it is been approximately 4 months since our last treatment/encounter. She explains that about 4 to 6 weeks ago she started to notice a pain in the right side of her middle back near her right shoulder blade. This has gotten slightly worse over the last few weeks and she has intense pain with sneezing and certain movements. Occasionally she will feel tingling and numbness into the arm and feels as if there may be a pinched nerve. She has tried some self massage without being able to identify a specific  "location of pain. She is nervous that she may have a more serious condition but wanted to see if it was a musculoskeletal.  ________   INITIAL INTAKE/SUBJECTIVE 10/24/22: She was referred here by her primary care physician, Dr. Quiana Aleman at her most recent wellness visit in 2022. She explains that her lower back pain started approximately 8 years ago after she had her first child. She explains that she did have an epidural and a  section and is unsure if this was the trigger or onset of the back pain. She also had a miscarriage with a ruptured uterus which required extensive repair before having her second child about 4 years ago with another  section.     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 She was seeing his local chiropractor 2-3 times a week for quite some time and found that it did help her moderately however she felt that the massage helped even more. She also received x-rays around this timeframe which was a couple of years ago.     Today she has pain into primary locations the neck and upper back and the lower back and left glute and leg.There is no headaches currently and she does not report any significant numbness and tingling in the hands or feet. There is also no noticeable weakness or change in her gait etc. she additionally has pain in the left glute and left hamstring region. The used to be pain in the calf but this has since resolved.     She does not recall any specific physical trauma. She does deal with PTSD after a work issue. She is currently working with her therapist and her primary care physician taking Xanax as needed for panic attacks and Zoloft for her anxiety and depression. She also is currently being treated for ADHD.     Review of Systems   Constitutional:  Negative for " chills and unexpected weight change.   HENT:  Negative for congestion and tinnitus.    Eyes:  Negative for pain.   Respiratory:  Negative for apnea.    Cardiovascular:  Negative for chest pain.   Gastrointestinal:  Negative for abdominal pain and vomiting.   Endocrine: Negative for cold intolerance and heat intolerance.   Genitourinary:  Negative for difficulty urinating and enuresis.        +    Allergic/Immunologic: Negative for immunocompromised state.   Neurological:  Negative for dizziness.   Psychiatric/Behavioral:  Negative for agitation.         + PTSD, ADHD, anxiety and depression     Objective   Observation : normal gait and forward head posture    Physical Exam  Examination findings (palpation & ROM): Tenderness, hypertonicity and trigger points palpated throughout the bilateral quadratus lumborum, bilateral thoracic/lower lumbar paraspinals, bilateral SI joints.  Mild tenderness to palpation with hypertonicity throughout the bilateral upper trapezii and cervical/upper thoracic paraspinals.     Segmental joint dysfunction was identified in the following areas using motion palpation and/or pain provocation assessment:  Cervical: C0-C3 (Supine)   Thoracic: T4-T11 (Prone)  Lumbar: L3-L5 (Side-lying)   Sacrum: L5/S1 and left SI (Drop)    Today's treatment:  Performed spinal manipulation to the regions of segmental dysfunction identified on examination using age-appropriate and injury specific force, and manual diversified technique. Technique Used: Diversified CMT and manual traction.    Performed soft tissue manipulation including IASTM (instrument assisted soft tissue mobilization), MFR (Myofacial Release) and IC (ischemic compression) to the hypertonic paraspinal muscles including bilateral thoracic paraspinals, bilateral rhomboids, middle and lower trapezius mm to patient tolerance (Start time 12:00 PM, End Time 12:15 PM). Cryoderm applied afterward.     Treatment Plan:   The patient and I  discussed the risks and benefits of chiropractic care. Based on the patient's subjective complaints along with the examination findings, it is advised that a course of chiropractic treatment by initiated. Consent for care was given both written and orally by the patient. The patient tolerated today's treatment with little or no additional discomfort and was instructed to contact the office for questions or concerns.     Treatment Frequency: Will see/treat patient every 2-4 weeks as therapeutic benefit is sustained, then frequency will be reduced to as needed for symptom management or as needed for acute flare-ups/exacerbation.     Please note: Voice-to-text software was used when completing this note.  While the note was proofread, portions may include grammatical errors.  Please contact me with any questions/concerns as it relates to these types of errors.

## 2024-05-10 ENCOUNTER — APPOINTMENT (OUTPATIENT)
Dept: INTEGRATIVE MEDICINE | Facility: CLINIC | Age: 49
End: 2024-05-10
Payer: COMMERCIAL

## 2024-05-21 ENCOUNTER — APPOINTMENT (OUTPATIENT)
Dept: INTEGRATIVE MEDICINE | Facility: CLINIC | Age: 49
End: 2024-05-21
Payer: COMMERCIAL

## 2024-05-21 ENCOUNTER — ALLIED HEALTH (OUTPATIENT)
Dept: INTEGRATIVE MEDICINE | Facility: CLINIC | Age: 49
End: 2024-05-21
Payer: COMMERCIAL

## 2024-05-21 DIAGNOSIS — G89.29 CHRONIC BILATERAL THORACIC BACK PAIN: Primary | ICD-10-CM

## 2024-05-21 DIAGNOSIS — M99.02 SEGMENTAL AND SOMATIC DYSFUNCTION OF THORACIC REGION: ICD-10-CM

## 2024-05-21 DIAGNOSIS — M79.10 MYALGIA: ICD-10-CM

## 2024-05-21 DIAGNOSIS — M99.04 SEGMENTAL AND SOMATIC DYSFUNCTION OF SACRAL REGION: ICD-10-CM

## 2024-05-21 DIAGNOSIS — M99.05 SEGMENTAL AND SOMATIC DYSFUNCTION OF PELVIC REGION: ICD-10-CM

## 2024-05-21 DIAGNOSIS — M99.00 SEGMENTAL AND SOMATIC DYSFUNCTION OF HEAD REGION: ICD-10-CM

## 2024-05-21 DIAGNOSIS — M54.2 NECK PAIN: ICD-10-CM

## 2024-05-21 DIAGNOSIS — M99.03 SEGMENTAL AND SOMATIC DYSFUNCTION OF LUMBAR REGION: ICD-10-CM

## 2024-05-21 DIAGNOSIS — M53.3 SACRAL BACK PAIN: ICD-10-CM

## 2024-05-21 DIAGNOSIS — M54.50 CHRONIC BILATERAL LOW BACK PAIN WITHOUT SCIATICA: ICD-10-CM

## 2024-05-21 DIAGNOSIS — M54.6 CHRONIC BILATERAL THORACIC BACK PAIN: Primary | ICD-10-CM

## 2024-05-21 DIAGNOSIS — M99.01 SEGMENTAL AND SOMATIC DYSFUNCTION OF CERVICAL REGION: ICD-10-CM

## 2024-05-21 DIAGNOSIS — M79.9 POSTURAL STRAIN: ICD-10-CM

## 2024-05-21 DIAGNOSIS — G89.29 CHRONIC BILATERAL LOW BACK PAIN WITHOUT SCIATICA: ICD-10-CM

## 2024-05-21 PROCEDURE — 97140 MANUAL THERAPY 1/> REGIONS: CPT | Performed by: CHIROPRACTOR

## 2024-05-21 PROCEDURE — 98942 CHIROPRACTIC MANJ 5 REGIONS: CPT | Performed by: CHIROPRACTOR

## 2024-05-21 ASSESSMENT — ENCOUNTER SYMPTOMS
DIFFICULTY URINATING: 0
APNEA: 0
ABDOMINAL PAIN: 0
UNEXPECTED WEIGHT CHANGE: 0
VOMITING: 0
DIZZINESS: 0
EYE PAIN: 0
CHILLS: 0
AGITATION: 0

## 2024-05-21 NOTE — PROGRESS NOTES
"Subjective   Patient ID: Magda Hough is a 49 y.o. female who presents today for the treatment of pain involving their L sacral pain, middle back stiffness.    This is visit 7 of the 2024 calendar year. [place] (no limit)    Fall risk: None. No falls in the last 6 months.     HPI -recently the entire lower back has been fairly symptomatic.  She is unsure if it is due to doing more yard work, sitting at more baseball games or due to some new exercises.  She did sign up for a web-based physical therapy program and will be doing her evaluation soon.  She is hopeful she will be given some home exercises that will help strengthen her core and avoid flareups in her pain.  She also had a massage since her last treatment which was helpful but did not completely resolve her symptoms.  Pain is fairly well localized to the entire lumbar and lumbosacral region.  No radiating pain into the legs or hips.  She also continues to have chronically tight thoracic paraspinals.    Last treatment 5/8/24: the mid scapular/thoracic region is her chief complaint today.  On Sunday it was so bad she was having her kids walk on her back and her  tried to give her an adjustment.  She did get a little \"crack\" which did help temporarily.  Today she would like to focus treatment on this area again but check full spine for any additional restrictions or imbalances.    4/29/24: no acute pain today but she is having pain across the entire lumbar and lumbosacral region.  Will focus treatment on this area but check full spine for any additional restrictions or imbalances.  She mentions that she is little more sore due to carrying her son who is approximately 40 pounds.  He injured his knee following and therefore she is needed to lift and carry more frequently than usual.  He is back to full weightbearing and therefore this should no longer be an issue moving forward.    3/15/24: she is doing quite well overall and has no specific pain issues " today.  She was having some pain a week ago but suspects this may be related to her menstrual cycle.  She is starting into perimenopause and her cycle has been quite irregular.  She is very bloated and had a lot of pelvic and back pain but this seemed to self resolved after her cycle started.  She will start tracking this to see if there is in fact a correlation and trend.    Today she would like to do more of a maintenance type treatment checking full spine for any restrictions or imbalances that may lead to pain in the future.    2/29/24: today she would like to focus treatment on her lower back which has been slightly exacerbated.  Upon further questioning it is tighter on the right thoracolumbar region and the left SI joint regions.  She is unsure if it is due to helping her children build a set out of cardboard for an upcoming school play, lifting up there new puppy, or the fact that she has not been as diligent with her yoga practice in the last several months.  Will continue to work on the soft tissue regions and reducing intersegmental joint restrictions to see if this provides measurable improvement.  It was also encouraged that she really visit the yoga and try make this more of a priority to see if it will also help us maintain treatment benefit and avoid future exacerbations.    2/14/24: today she is feeling most of her pain in the midthoracic/intrascapular region.  She suspects it is due to lifting her puppy a lot more.  She also did have a stay occasion where she was in a hotel room for night and was sleeping on a different bed and pillow than usual.  Will focus our soft tissue work on this area but check full spine for any additional restrictions.    1/30/24: today she would like to focus her treatment on the left lower back region.  She just got a new puppy which weighs approximately 20 pounds and has been lifting more than usual.     12/6/23: Overall no acute pain issues, she is still having L  "sacral soreness and tightness intermittently so this will be our focus of treatment today.   ________  ReEXAM 23: Magda has not been in for treatment since 2022. Therefore it is been approximately 4 months since our last treatment/encounter. She explains that about 4 to 6 weeks ago she started to notice a pain in the right side of her middle back near her right shoulder blade. This has gotten slightly worse over the last few weeks and she has intense pain with sneezing and certain movements. Occasionally she will feel tingling and numbness into the arm and feels as if there may be a pinched nerve. She has tried some self massage without being able to identify a specific location of pain. She is nervous that she may have a more serious condition but wanted to see if it was a musculoskeletal.  ________   INITIAL INTAKE/SUBJECTIVE 10/24/22: She was referred here by her primary care physician, Dr. Quiana Aleman at her most recent wellness visit in 2022. She explains that her lower back pain started approximately 8 years ago after she had her first child. She explains that she did have an epidural and a  section and is unsure if this was the trigger or onset of the back pain. She also had a miscarriage with a ruptured uterus which required extensive repair before having her second child about 4 years ago with another  section.     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 She was seeing his local chiropractor 2-3 times a week for quite some time and found that it did help her moderately however she felt that the massage helped even more. She also received x-rays around this timeframe which was a couple of years ago.     Today she has pain into primary locations the neck and upper back and the lower " back and left glute and leg.There is no headaches currently and she does not report any significant numbness and tingling in the hands or feet. There is also no noticeable weakness or change in her gait etc. she additionally has pain in the left glute and left hamstring region. The used to be pain in the calf but this has since resolved.     She does not recall any specific physical trauma. She does deal with PTSD after a work issue. She is currently working with her therapist and her primary care physician taking Xanax as needed for panic attacks and Zoloft for her anxiety and depression. She also is currently being treated for ADHD.     Review of Systems   Constitutional:  Negative for chills and unexpected weight change.   HENT:  Negative for congestion and tinnitus.    Eyes:  Negative for pain.   Respiratory:  Negative for apnea.    Cardiovascular:  Negative for chest pain.   Gastrointestinal:  Negative for abdominal pain and vomiting.   Endocrine: Negative for cold intolerance and heat intolerance.   Genitourinary:  Negative for difficulty urinating and enuresis.        +    Allergic/Immunologic: Negative for immunocompromised state.   Neurological:  Negative for dizziness.   Psychiatric/Behavioral:  Negative for agitation.         + PTSD, ADHD, anxiety and depression     Objective   Observation : normal gait and forward head posture    Physical Exam  Examination findings (palpation & ROM): Tenderness, hypertonicity and trigger points palpated throughout the bilateral quadratus lumborum, bilateral thoracic/lower lumbar paraspinals, bilateral SI joints.  Mild tenderness to palpation with hypertonicity throughout the bilateral upper trapezii, middle trapezius, rhomboids and cervical/upper thoracic paraspinals.     Segmental joint dysfunction was identified in the following areas using motion palpation and/or pain provocation assessment:  Cervical: C0-C3 (Supine)   Thoracic: T4-T11 (Prone)  Lumbar: L3-L5  (Side-lying)   Sacrum: L5/S1 and left SI (Drop)    Today's treatment:  Performed spinal manipulation to the regions of segmental dysfunction identified on examination using age-appropriate and injury specific force, and manual diversified technique. Technique Used: Diversified CMT and manual traction.    Performed soft tissue manipulation including IASTM (instrument assisted soft tissue mobilization), MFR (Myofacial Release) and IC (ischemic compression) to the hypertonic paraspinal muscles including bilateral thoracic paraspinals, bilateral rhomboids, middle trapezius, lumbar paraspinals, upper glutes, quadratus lumborum musculature to patient tolerance (Start time 10:40 AM, End Time 10:55 AM). Cryoderm applied afterward.     Treatment Plan:   The patient and I discussed the risks and benefits of chiropractic care. Based on the patient's subjective complaints along with the examination findings, it is advised that a course of chiropractic treatment by initiated. Consent for care was given both written and orally by the patient. The patient tolerated today's treatment with little or no additional discomfort and was instructed to contact the office for questions or concerns.     Treatment Frequency: Will see/treat patient every 2-4 weeks as therapeutic benefit is sustained, then frequency will be reduced to as needed for symptom management or as needed for acute flare-ups/exacerbation.     Please note: Voice-to-text software was used when completing this note.  While the note was proofread, portions may include grammatical errors.  Please contact me with any questions/concerns as it relates to these types of errors.

## 2024-06-12 ENCOUNTER — APPOINTMENT (OUTPATIENT)
Dept: INTEGRATIVE MEDICINE | Facility: CLINIC | Age: 49
End: 2024-06-12
Payer: COMMERCIAL

## 2024-07-02 ENCOUNTER — APPOINTMENT (OUTPATIENT)
Dept: INTEGRATIVE MEDICINE | Facility: CLINIC | Age: 49
End: 2024-07-02
Payer: COMMERCIAL

## 2024-07-02 DIAGNOSIS — M99.05 SEGMENTAL AND SOMATIC DYSFUNCTION OF PELVIC REGION: ICD-10-CM

## 2024-07-02 DIAGNOSIS — M99.01 SEGMENTAL AND SOMATIC DYSFUNCTION OF CERVICAL REGION: ICD-10-CM

## 2024-07-02 DIAGNOSIS — M99.04 SEGMENTAL AND SOMATIC DYSFUNCTION OF SACRAL REGION: ICD-10-CM

## 2024-07-02 DIAGNOSIS — M54.50 CHRONIC BILATERAL LOW BACK PAIN WITHOUT SCIATICA: Primary | ICD-10-CM

## 2024-07-02 DIAGNOSIS — M79.10 MYALGIA: ICD-10-CM

## 2024-07-02 DIAGNOSIS — M53.3 SACRAL BACK PAIN: ICD-10-CM

## 2024-07-02 DIAGNOSIS — M79.9 POSTURAL STRAIN: ICD-10-CM

## 2024-07-02 DIAGNOSIS — M54.6 CHRONIC BILATERAL THORACIC BACK PAIN: ICD-10-CM

## 2024-07-02 DIAGNOSIS — M99.02 SEGMENTAL AND SOMATIC DYSFUNCTION OF THORACIC REGION: ICD-10-CM

## 2024-07-02 DIAGNOSIS — G89.29 CHRONIC BILATERAL LOW BACK PAIN WITHOUT SCIATICA: Primary | ICD-10-CM

## 2024-07-02 DIAGNOSIS — M99.03 SEGMENTAL AND SOMATIC DYSFUNCTION OF LUMBAR REGION: ICD-10-CM

## 2024-07-02 DIAGNOSIS — G89.29 CHRONIC BILATERAL THORACIC BACK PAIN: ICD-10-CM

## 2024-07-02 DIAGNOSIS — M99.00 SEGMENTAL AND SOMATIC DYSFUNCTION OF HEAD REGION: ICD-10-CM

## 2024-07-02 ASSESSMENT — ENCOUNTER SYMPTOMS
VOMITING: 0
ABDOMINAL PAIN: 0
DIFFICULTY URINATING: 0
UNEXPECTED WEIGHT CHANGE: 0
EYE PAIN: 0
APNEA: 0
CHILLS: 0
DIZZINESS: 0
AGITATION: 0

## 2024-07-02 NOTE — PROGRESS NOTES
"Subjective   Patient ID: Magda Hough is a 49 y.o. female who presents today for the treatment of pain involving their L sacral pain, middle back stiffness.    This is visit 8 of the 2024 calendar year. [place] (no limit)    Fall risk: None. No falls in the last 6 months.     HPI -no acute pain and no specific injury, but the lower back has been a little more symptomatic.  It is not in her typical left-sided sacrum more diffuse across the entire lower back and feels \"grinding\".  She would like to focus soft tissue treatment here but check full spine for any additional restrictions or imbalances.    Last treatment 5/21/24: recently the entire lower back has been fairly symptomatic.  She is unsure if it is due to doing more yard work, sitting at more baseball games or due to some new exercises.  She did sign up for a web-based physical therapy program and will be doing her evaluation soon.  She is hopeful she will be given some home exercises that will help strengthen her core and avoid flareups in her pain.  She also had a massage since her last treatment which was helpful but did not completely resolve her symptoms.  Pain is fairly well localized to the entire lumbar and lumbosacral region.  No radiating pain into the legs or hips.  She also continues to have chronically tight thoracic paraspinals.    5/8/24: the mid scapular/thoracic region is her chief complaint today.  On Sunday it was so bad she was having her kids walk on her back and her  tried to give her an adjustment.  She did get a little \"crack\" which did help temporarily.  Today she would like to focus treatment on this area again but check full spine for any additional restrictions or imbalances.    4/29/24: no acute pain today but she is having pain across the entire lumbar and lumbosacral region.  Will focus treatment on this area but check full spine for any additional restrictions or imbalances.  She mentions that she is little more sore due " to carrying her son who is approximately 40 pounds.  He injured his knee following and therefore she is needed to lift and carry more frequently than usual.  He is back to full weightbearing and therefore this should no longer be an issue moving forward.    3/15/24: she is doing quite well overall and has no specific pain issues today.  She was having some pain a week ago but suspects this may be related to her menstrual cycle.  She is starting into perimenopause and her cycle has been quite irregular.  She is very bloated and had a lot of pelvic and back pain but this seemed to self resolved after her cycle started.  She will start tracking this to see if there is in fact a correlation and trend.    Today she would like to do more of a maintenance type treatment checking full spine for any restrictions or imbalances that may lead to pain in the future.    2/29/24: today she would like to focus treatment on her lower back which has been slightly exacerbated.  Upon further questioning it is tighter on the right thoracolumbar region and the left SI joint regions.  She is unsure if it is due to helping her children build a set out of cardboard for an upcoming school play, lifting up there new puppy, or the fact that she has not been as diligent with her yoga practice in the last several months.  Will continue to work on the soft tissue regions and reducing intersegmental joint restrictions to see if this provides measurable improvement.  It was also encouraged that she really visit the yoga and try make this more of a priority to see if it will also help us maintain treatment benefit and avoid future exacerbations.    2/14/24: today she is feeling most of her pain in the midthoracic/intrascapular region.  She suspects it is due to lifting her puppy a lot more.  She also did have a stay occasion where she was in a hotel room for night and was sleeping on a different bed and pillow than usual.  Will focus our soft tissue  "work on this area but check full spine for any additional restrictions.    24: today she would like to focus her treatment on the left lower back region.  She just got a new puppy which weighs approximately 20 pounds and has been lifting more than usual.     23: Overall no acute pain issues, she is still having L sacral soreness and tightness intermittently so this will be our focus of treatment today.   ________  ReEXAM 23: Magda has not been in for treatment since 2022. Therefore it is been approximately 4 months since our last treatment/encounter. She explains that about 4 to 6 weeks ago she started to notice a pain in the right side of her middle back near her right shoulder blade. This has gotten slightly worse over the last few weeks and she has intense pain with sneezing and certain movements. Occasionally she will feel tingling and numbness into the arm and feels as if there may be a pinched nerve. She has tried some self massage without being able to identify a specific location of pain. She is nervous that she may have a more serious condition but wanted to see if it was a musculoskeletal.  ________   INITIAL INTAKE/SUBJECTIVE 10/24/22: She was referred here by her primary care physician, Dr. Quiana Aleman at her most recent wellness visit in 2022. She explains that her lower back pain started approximately 8 years ago after she had her first child. She explains that she did have an epidural and a  section and is unsure if this was the trigger or onset of the back pain. She also had a miscarriage with a ruptured uterus which required extensive repair before having her second child about 4 years ago with another  section.     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 She was " seeing his local chiropractor 2-3 times a week for quite some time and found that it did help her moderately however she felt that the massage helped even more. She also received x-rays around this timeframe which was a couple of years ago.     Today she has pain into primary locations the neck and upper back and the lower back and left glute and leg.There is no headaches currently and she does not report any significant numbness and tingling in the hands or feet. There is also no noticeable weakness or change in her gait etc. she additionally has pain in the left glute and left hamstring region. The used to be pain in the calf but this has since resolved.     She does not recall any specific physical trauma. She does deal with PTSD after a work issue. She is currently working with her therapist and her primary care physician taking Xanax as needed for panic attacks and Zoloft for her anxiety and depression. She also is currently being treated for ADHD.     Review of Systems   Constitutional:  Negative for chills and unexpected weight change.   HENT:  Negative for congestion and tinnitus.    Eyes:  Negative for pain.   Respiratory:  Negative for apnea.    Cardiovascular:  Negative for chest pain.   Gastrointestinal:  Negative for abdominal pain and vomiting.   Endocrine: Negative for cold intolerance and heat intolerance.   Genitourinary:  Negative for difficulty urinating and enuresis.        +    Allergic/Immunologic: Negative for immunocompromised state.   Neurological:  Negative for dizziness.   Psychiatric/Behavioral:  Negative for agitation.         + PTSD, ADHD, anxiety and depression     Objective   Observation : normal gait and forward head posture    Physical Exam  Examination findings (palpation & ROM): Tenderness, hypertonicity and trigger points palpated throughout the bilateral quadratus lumborum, bilateral thoracic/lower lumbar paraspinals, bilateral SI joints.  Mild tenderness to palpation  with hypertonicity throughout the bilateral upper trapezii, middle trapezius, rhomboids and cervical/upper thoracic paraspinals.  Left greater than right hip flexor tightness and psoas tightness.    Segmental joint dysfunction was identified in the following areas using motion palpation and/or pain provocation assessment:  Cervical: C0-C3 (Supine)   Thoracic: T4-T11 (Prone)  Lumbar: L3-L5 (Side-lying)   Sacrum: L5/S1 and left SI (Drop)    Today's treatment:  Performed spinal manipulation to the regions of segmental dysfunction identified on examination using age-appropriate and injury specific force, and manual diversified technique. Technique Used: Diversified CMT and manual traction.    Performed soft tissue manipulation including IASTM (instrument assisted soft tissue mobilization), MFR (Myofacial Release) and IC (ischemic compression) to the hypertonic paraspinal muscles including bilateral thoracic paraspinals, bilateral rhomboids, middle trapezius, lumbar paraspinals, upper glutes, quadratus lumborum musculature to patient tolerance (Start time 3:20 PM, End Time 3:35 PM). Cryoderm applied afterward.     Treatment Plan:   The patient and I discussed the risks and benefits of chiropractic care. Based on the patient's subjective complaints along with the examination findings, it is advised that a course of chiropractic treatment by initiated. Consent for care was given both written and orally by the patient. The patient tolerated today's treatment with little or no additional discomfort and was instructed to contact the office for questions or concerns.     Treatment Frequency: Will see/treat patient every 2-4 weeks as therapeutic benefit is sustained, then frequency will be reduced to as needed for symptom management or as needed for acute flare-ups/exacerbation.     Please note: Voice-to-text software was used when completing this note.  While the note was proofread, portions may include grammatical errors.   Please contact me with any questions/concerns as it relates to these types of errors.

## 2024-07-11 ENCOUNTER — OFFICE VISIT (OUTPATIENT)
Dept: PRIMARY CARE | Facility: CLINIC | Age: 49
End: 2024-07-11
Payer: COMMERCIAL

## 2024-07-11 VITALS
RESPIRATION RATE: 16 BRPM | SYSTOLIC BLOOD PRESSURE: 114 MMHG | TEMPERATURE: 98 F | DIASTOLIC BLOOD PRESSURE: 80 MMHG | HEART RATE: 78 BPM | OXYGEN SATURATION: 98 % | WEIGHT: 135 LBS | BODY MASS INDEX: 22.47 KG/M2

## 2024-07-11 DIAGNOSIS — H61.21 IMPACTED CERUMEN OF RIGHT EAR: Primary | ICD-10-CM

## 2024-07-11 PROCEDURE — 1036F TOBACCO NON-USER: CPT | Performed by: FAMILY MEDICINE

## 2024-07-11 PROCEDURE — 99213 OFFICE O/P EST LOW 20 MIN: CPT | Performed by: FAMILY MEDICINE

## 2024-07-11 PROCEDURE — 69209 REMOVE IMPACTED EAR WAX UNI: CPT | Performed by: FAMILY MEDICINE

## 2024-07-11 ASSESSMENT — ENCOUNTER SYMPTOMS
DIARRHEA: 0
WHEEZING: 0
SORE THROAT: 0
NAUSEA: 0
SHORTNESS OF BREATH: 0
COUGH: 0
FEVER: 0
VOMITING: 0

## 2024-07-11 NOTE — PROGRESS NOTES
Patient ID: Magda Hough is a 49 y.o. female.    Ear Cerumen Removal    Date/Time: 7/11/2024 10:04 AM    Performed by: Sharon Morton MD  Authorized by: Sharon Morton MD    Consent:     Consent obtained:  Verbal    Consent given by:  Patient    Risks, benefits, and alternatives were discussed: yes      Risks discussed:  Pain, dizziness and incomplete removal    Alternatives discussed:  Delayed treatment  Universal protocol:     Procedure explained and questions answered to patient or proxy's satisfaction: yes      Patient identity confirmed:  Verbally with patient  Procedure details:     Location:  R ear    Procedure type: irrigation      Procedure outcomes: cerumen removed    Post-procedure details:     Inspection:  Ear canal clear    Hearing quality:  Improved    Procedure completion:  Tolerated well, no immediate complications  Subjective   Patient ID: Magda Hough is a 49 y.o. female who presents for Ear Fullness.    Ear Fullness   There has been no fever. Associated symptoms include hearing loss. Pertinent negatives include no coughing, diarrhea, ear discharge, sore throat or vomiting. Treatments tried: Debrox.        Review of Systems   Constitutional:  Negative for fever.   HENT:  Positive for congestion and hearing loss. Negative for ear discharge, ear pain and sore throat.         Right Ear fullness , seen at  , told to use debrox x 4 d     Respiratory:  Negative for cough, shortness of breath and wheezing.    Gastrointestinal:  Negative for diarrhea, nausea and vomiting.       Objective   /80   Pulse 78   Temp 36.7 °C (98 °F)   Resp 16   Wt 61.2 kg (135 lb)   SpO2 98%   BMI 22.47 kg/m²     Physical Exam  Vitals and nursing note reviewed.   Constitutional:       General: She is not in acute distress.     Appearance: Normal appearance.   HENT:      Head: Normocephalic and atraumatic.      Right Ear: Tympanic membrane, ear canal and external ear normal.      Left Ear: Tympanic membrane, ear  canal and external ear normal.      Ears:      Comments: Right ear was clear after removal of cerumen     Nose: Nose normal.      Mouth/Throat:      Mouth: Mucous membranes are moist.      Pharynx: Oropharynx is clear.   Cardiovascular:      Rate and Rhythm: Normal rate and regular rhythm.      Heart sounds: Normal heart sounds.   Pulmonary:      Effort: Pulmonary effort is normal.      Breath sounds: Normal breath sounds.   Musculoskeletal:      Cervical back: Neck supple.   Lymphadenopathy:      Cervical: No cervical adenopathy.   Neurological:      Mental Status: She is alert.         Assessment/Plan   Problem List Items Addressed This Visit             ICD-10-CM    Impacted cerumen of right ear - Primary H61.21     Pt right ear was clear after irrigation  Symptom improved  May use motrin if any discomfort as needed  F/up as needed         Relevant Orders    Ear Cerumen Removal

## 2024-07-11 NOTE — ASSESSMENT & PLAN NOTE
Pt right ear was clear after irrigation  Symptom improved  May use motrin if any discomfort as needed  F/up as needed

## 2024-08-02 ENCOUNTER — ALLIED HEALTH (OUTPATIENT)
Dept: INTEGRATIVE MEDICINE | Facility: CLINIC | Age: 49
End: 2024-08-02
Payer: COMMERCIAL

## 2024-08-02 DIAGNOSIS — M54.50 CHRONIC BILATERAL LOW BACK PAIN WITHOUT SCIATICA: ICD-10-CM

## 2024-08-02 DIAGNOSIS — M79.9 POSTURAL STRAIN: ICD-10-CM

## 2024-08-02 DIAGNOSIS — M25.512 LEFT SHOULDER PAIN, UNSPECIFIED CHRONICITY: ICD-10-CM

## 2024-08-02 DIAGNOSIS — G89.29 CHRONIC BILATERAL THORACIC BACK PAIN: Primary | ICD-10-CM

## 2024-08-02 DIAGNOSIS — M99.02 SEGMENTAL AND SOMATIC DYSFUNCTION OF THORACIC REGION: ICD-10-CM

## 2024-08-02 DIAGNOSIS — M79.10 MYALGIA: ICD-10-CM

## 2024-08-02 DIAGNOSIS — M54.2 NECK PAIN: ICD-10-CM

## 2024-08-02 DIAGNOSIS — M99.03 SEGMENTAL AND SOMATIC DYSFUNCTION OF LUMBAR REGION: ICD-10-CM

## 2024-08-02 DIAGNOSIS — G89.29 CHRONIC BILATERAL LOW BACK PAIN WITHOUT SCIATICA: ICD-10-CM

## 2024-08-02 DIAGNOSIS — M99.01 SEGMENTAL AND SOMATIC DYSFUNCTION OF CERVICAL REGION: ICD-10-CM

## 2024-08-02 DIAGNOSIS — M54.6 CHRONIC BILATERAL THORACIC BACK PAIN: Primary | ICD-10-CM

## 2024-08-02 PROCEDURE — 98941 CHIROPRACT MANJ 3-4 REGIONS: CPT | Performed by: CHIROPRACTOR

## 2024-08-02 ASSESSMENT — ENCOUNTER SYMPTOMS
VOMITING: 0
DIZZINESS: 0
UNEXPECTED WEIGHT CHANGE: 0
CHILLS: 0
DIFFICULTY URINATING: 0
AGITATION: 0
APNEA: 0
ABDOMINAL PAIN: 0
EYE PAIN: 0

## 2024-08-02 NOTE — PROGRESS NOTES
"Subjective   Patient ID: Magda Hough is a 49 y.o. female who presents today for the treatment of pain involving their L sacral pain, middle back stiffness.    This is visit 9 of the 2024 calendar year. [place] (no limit)    Fall risk: None. No falls in the last 6 months.     HPI -her chief complaint today and reason for scheduling appointment was due to upper back and intrascapular pain. She just had a massage this am and is hoping that the massage paired with the adjustment will remedy and resolve the issues. She was walking her dog earlier this week when the dog pulled at the leash causing her to strain the neck and upper back  region. Pain is primarily in the middle region.     Last treatment 7/2/24: no acute pain and no specific injury, but the lower back has been a little more symptomatic.  It is not in her typical left-sided sacrum more diffuse across the entire lower back and feels \"grinding\".  She would like to focus soft tissue treatment here but check full spine for any additional restrictions or imbalances.    5/21/24: recently the entire lower back has been fairly symptomatic.  She is unsure if it is due to doing more yard work, sitting at more baseball games or due to some new exercises.  She did sign up for a web-based physical therapy program and will be doing her evaluation soon.  She is hopeful she will be given some home exercises that will help strengthen her core and avoid flareups in her pain.  She also had a massage since her last treatment which was helpful but did not completely resolve her symptoms.  Pain is fairly well localized to the entire lumbar and lumbosacral region.  No radiating pain into the legs or hips.  She also continues to have chronically tight thoracic paraspinals.    5/8/24: the mid scapular/thoracic region is her chief complaint today.  On Sunday it was so bad she was having her kids walk on her back and her  tried to give her an adjustment.  She did get a little " "\"crack\" which did help temporarily.  Today she would like to focus treatment on this area again but check full spine for any additional restrictions or imbalances.    4/29/24: no acute pain today but she is having pain across the entire lumbar and lumbosacral region.  Will focus treatment on this area but check full spine for any additional restrictions or imbalances.  She mentions that she is little more sore due to carrying her son who is approximately 40 pounds.  He injured his knee following and therefore she is needed to lift and carry more frequently than usual.  He is back to full weightbearing and therefore this should no longer be an issue moving forward.    3/15/24: she is doing quite well overall and has no specific pain issues today.  She was having some pain a week ago but suspects this may be related to her menstrual cycle.  She is starting into perimenopause and her cycle has been quite irregular.  She is very bloated and had a lot of pelvic and back pain but this seemed to self resolved after her cycle started.  She will start tracking this to see if there is in fact a correlation and trend.    Today she would like to do more of a maintenance type treatment checking full spine for any restrictions or imbalances that may lead to pain in the future.    2/29/24: today she would like to focus treatment on her lower back which has been slightly exacerbated.  Upon further questioning it is tighter on the right thoracolumbar region and the left SI joint regions.  She is unsure if it is due to helping her children build a set out of cardboard for an upcoming school play, lifting up there new puppy, or the fact that she has not been as diligent with her yoga practice in the last several months.  Will continue to work on the soft tissue regions and reducing intersegmental joint restrictions to see if this provides measurable improvement.  It was also encouraged that she really visit the yoga and try make this " more of a priority to see if it will also help us maintain treatment benefit and avoid future exacerbations.    24: today she is feeling most of her pain in the midthoracic/intrascapular region.  She suspects it is due to lifting her puppy a lot more.  She also did have a stay occasion where she was in a hotel room for night and was sleeping on a different bed and pillow than usual.  Will focus our soft tissue work on this area but check full spine for any additional restrictions.    24: today she would like to focus her treatment on the left lower back region.  She just got a new puppy which weighs approximately 20 pounds and has been lifting more than usual.     23: Overall no acute pain issues, she is still having L sacral soreness and tightness intermittently so this will be our focus of treatment today.   ________  ReEXAM 23: Magda has not been in for treatment since 2022. Therefore it is been approximately 4 months since our last treatment/encounter. She explains that about 4 to 6 weeks ago she started to notice a pain in the right side of her middle back near her right shoulder blade. This has gotten slightly worse over the last few weeks and she has intense pain with sneezing and certain movements. Occasionally she will feel tingling and numbness into the arm and feels as if there may be a pinched nerve. She has tried some self massage without being able to identify a specific location of pain. She is nervous that she may have a more serious condition but wanted to see if it was a musculoskeletal.  ________   INITIAL INTAKE/SUBJECTIVE 10/24/22: She was referred here by her primary care physician, Dr. Quiana Aleman at her most recent wellness visit in 2022. She explains that her lower back pain started approximately 8 years ago after she had her first child. She explains that she did have an epidural and a  section and is unsure if this was the trigger or onset  "of the back pain. She also had a miscarriage with a ruptured uterus which required extensive repair before having her second child about 4 years ago with another  section.     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 She was seeing his local chiropractor 2-3 times a week for quite some time and found that it did help her moderately however she felt that the massage helped even more. She also received x-rays around this timeframe which was a couple of years ago.     Today she has pain into primary locations the neck and upper back and the lower back and left glute and leg.There is no headaches currently and she does not report any significant numbness and tingling in the hands or feet. There is also no noticeable weakness or change in her gait etc. she additionally has pain in the left glute and left hamstring region. The used to be pain in the calf but this has since resolved.     She does not recall any specific physical trauma. She does deal with PTSD after a work issue. She is currently working with her therapist and her primary care physician taking Xanax as needed for panic attacks and Zoloft for her anxiety and depression. She also is currently being treated for ADHD.     Review of Systems   Constitutional:  Negative for chills and unexpected weight change.   HENT:  Negative for congestion and tinnitus.    Eyes:  Negative for pain.   Respiratory:  Negative for apnea.    Cardiovascular:  Negative for chest pain.   Gastrointestinal:  Negative for abdominal pain and vomiting.   Endocrine: Negative for cold intolerance and heat intolerance.   Genitourinary:  Negative for difficulty urinating and enuresis.        +    Allergic/Immunologic: Negative for immunocompromised state.   Neurological:  Negative for dizziness. "   Psychiatric/Behavioral:  Negative for agitation.         + PTSD, ADHD, anxiety and depression     Objective   Observation : normal gait and forward head posture    Physical Exam  Examination findings (palpation & ROM): Tenderness, hypertonicity and trigger points palpated throughout the rhomboids, lower traps, bilateral thoracic/lower lumbar paraspinals, bilateral SI joints.  Mild tenderness to palpation with hypertonicity throughout the bilateral upper trapezii, middle trapezius and cervical/upper thoracic paraspinals.  Left greater than right hip flexor tightness and psoas tightness.    Segmental joint dysfunction was identified in the following areas using motion palpation and/or pain provocation assessment:  Cervical: C0-C3 (Supine)   Thoracic: T4-T11 (Prone)  Lumbar: L3-L5 (Side-lying)   Sacrum: L5/S1 and left SI (Drop)    Today's treatment:  Performed spinal manipulation to the regions of segmental dysfunction identified on examination using age-appropriate and injury specific force, and manual diversified technique. Technique Used: Diversified CMT and manual traction.    Performed (brief) soft tissue manipulation including IASTM (instrument assisted soft tissue mobilization), MFR (Myofacial Release) and IC (ischemic compression) to the hypertonic paraspinal muscles including bilateral thoracic paraspinals, bilateral rhomboids, middle trapezius, lumbar paraspinals, upper trapezius musculature to patient tolerance (Start time 11:25 AM, End Time 11:35 AM). < 1 unit    Treatment Plan:   The patient and I discussed the risks and benefits of chiropractic care. Based on the patient's subjective complaints along with the examination findings, it is advised that a course of chiropractic treatment by initiated. Consent for care was given both written and orally by the patient. The patient tolerated today's treatment with little or no additional discomfort and was instructed to contact the office for questions or  concerns.     Treatment Frequency: Will see/treat patient every 2-4 weeks as therapeutic benefit is sustained, then frequency will be reduced to as needed for symptom management or as needed for acute flare-ups/exacerbation.     Please note: Voice-to-text software was used when completing this note.  While the note was proofread, portions may include grammatical errors.  Please contact me with any questions/concerns as it relates to these types of errors.

## 2024-08-07 ENCOUNTER — APPOINTMENT (OUTPATIENT)
Dept: INTEGRATIVE MEDICINE | Facility: CLINIC | Age: 49
End: 2024-08-07
Payer: COMMERCIAL

## 2024-08-12 ENCOUNTER — APPOINTMENT (OUTPATIENT)
Dept: INTEGRATIVE MEDICINE | Facility: CLINIC | Age: 49
End: 2024-08-12
Payer: COMMERCIAL

## 2024-08-26 ENCOUNTER — APPOINTMENT (OUTPATIENT)
Dept: INTEGRATIVE MEDICINE | Facility: CLINIC | Age: 49
End: 2024-08-26
Payer: COMMERCIAL

## 2024-08-26 DIAGNOSIS — M99.03 SEGMENTAL AND SOMATIC DYSFUNCTION OF LUMBAR REGION: ICD-10-CM

## 2024-08-26 DIAGNOSIS — M99.01 SEGMENTAL AND SOMATIC DYSFUNCTION OF CERVICAL REGION: ICD-10-CM

## 2024-08-26 DIAGNOSIS — M54.2 NECK PAIN: ICD-10-CM

## 2024-08-26 DIAGNOSIS — G89.29 CHRONIC BILATERAL THORACIC BACK PAIN: ICD-10-CM

## 2024-08-26 DIAGNOSIS — M99.02 SEGMENTAL AND SOMATIC DYSFUNCTION OF THORACIC REGION: ICD-10-CM

## 2024-08-26 DIAGNOSIS — M54.6 CHRONIC BILATERAL THORACIC BACK PAIN: ICD-10-CM

## 2024-08-26 DIAGNOSIS — M25.512 LEFT SHOULDER PAIN, UNSPECIFIED CHRONICITY: ICD-10-CM

## 2024-08-26 DIAGNOSIS — M79.10 MYALGIA: ICD-10-CM

## 2024-08-26 DIAGNOSIS — M99.00 SEGMENTAL AND SOMATIC DYSFUNCTION OF HEAD REGION: ICD-10-CM

## 2024-08-26 DIAGNOSIS — M99.04 SEGMENTAL AND SOMATIC DYSFUNCTION OF SACRAL REGION: ICD-10-CM

## 2024-08-26 DIAGNOSIS — M79.9 POSTURAL STRAIN: ICD-10-CM

## 2024-08-26 DIAGNOSIS — M53.3 SACRAL BACK PAIN: ICD-10-CM

## 2024-08-26 DIAGNOSIS — G89.29 CHRONIC BILATERAL LOW BACK PAIN WITHOUT SCIATICA: Primary | ICD-10-CM

## 2024-08-26 DIAGNOSIS — M54.50 CHRONIC BILATERAL LOW BACK PAIN WITHOUT SCIATICA: Primary | ICD-10-CM

## 2024-08-26 PROCEDURE — 98942 CHIROPRACTIC MANJ 5 REGIONS: CPT | Performed by: CHIROPRACTOR

## 2024-08-26 ASSESSMENT — ENCOUNTER SYMPTOMS
AGITATION: 0
VOMITING: 0
DIFFICULTY URINATING: 0
DIZZINESS: 0
CHILLS: 0
EYE PAIN: 0
UNEXPECTED WEIGHT CHANGE: 0
APNEA: 0
ABDOMINAL PAIN: 0

## 2024-08-26 NOTE — PROGRESS NOTES
"Subjective   Patient ID: Magda Hough is a 49 y.o. female who presents today for the treatment of pain involving their L sacral pain, middle back stiffness.    This is visit 10 of the 2024 calendar year. [place] (no limit)    Fall risk: None. No falls in the last 6 months.     HPI - She is overall feeling good, no acute issues. Here mostly for maintenance. Lower back has been stiff but no acute pain.     Last treatment  8/2/24: her chief complaint today and reason for scheduling appointment was due to upper back and intrascapular pain. She just had a massage this am and is hoping that the massage paired with the adjustment will remedy and resolve the issues. She was walking her dog earlier this week when the dog pulled at the leash causing her to strain the neck and upper back  region. Pain is primarily in the middle region.     7/2/24: no acute pain and no specific injury, but the lower back has been a little more symptomatic.  It is not in her typical left-sided sacrum more diffuse across the entire lower back and feels \"grinding\".  She would like to focus soft tissue treatment here but check full spine for any additional restrictions or imbalances.    5/21/24: recently the entire lower back has been fairly symptomatic.  She is unsure if it is due to doing more yard work, sitting at more baseball games or due to some new exercises.  She did sign up for a web-based physical therapy program and will be doing her evaluation soon.  She is hopeful she will be given some home exercises that will help strengthen her core and avoid flareups in her pain.  She also had a massage since her last treatment which was helpful but did not completely resolve her symptoms.  Pain is fairly well localized to the entire lumbar and lumbosacral region.  No radiating pain into the legs or hips.  She also continues to have chronically tight thoracic paraspinals.    5/8/24: the mid scapular/thoracic region is her chief complaint today.  On " "Sunday it was so bad she was having her kids walk on her back and her  tried to give her an adjustment.  She did get a little \"crack\" which did help temporarily.  Today she would like to focus treatment on this area again but check full spine for any additional restrictions or imbalances.    4/29/24: no acute pain today but she is having pain across the entire lumbar and lumbosacral region.  Will focus treatment on this area but check full spine for any additional restrictions or imbalances.  She mentions that she is little more sore due to carrying her son who is approximately 40 pounds.  He injured his knee following and therefore she is needed to lift and carry more frequently than usual.  He is back to full weightbearing and therefore this should no longer be an issue moving forward.    3/15/24: she is doing quite well overall and has no specific pain issues today.  She was having some pain a week ago but suspects this may be related to her menstrual cycle.  She is starting into perimenopause and her cycle has been quite irregular.  She is very bloated and had a lot of pelvic and back pain but this seemed to self resolved after her cycle started.  She will start tracking this to see if there is in fact a correlation and trend.    Today she would like to do more of a maintenance type treatment checking full spine for any restrictions or imbalances that may lead to pain in the future.    2/29/24: today she would like to focus treatment on her lower back which has been slightly exacerbated.  Upon further questioning it is tighter on the right thoracolumbar region and the left SI joint regions.  She is unsure if it is due to helping her children build a set out of cardboard for an upcoming school play, lifting up there new puppy, or the fact that she has not been as diligent with her yoga practice in the last several months.  Will continue to work on the soft tissue regions and reducing intersegmental joint " restrictions to see if this provides measurable improvement.  It was also encouraged that she really visit the yoga and try make this more of a priority to see if it will also help us maintain treatment benefit and avoid future exacerbations.    2/14/24: today she is feeling most of her pain in the midthoracic/intrascapular region.  She suspects it is due to lifting her puppy a lot more.  She also did have a stay occasion where she was in a hotel room for night and was sleeping on a different bed and pillow than usual.  Will focus our soft tissue work on this area but check full spine for any additional restrictions.    1/30/24: today she would like to focus her treatment on the left lower back region.  She just got a new puppy which weighs approximately 20 pounds and has been lifting more than usual.     12/6/23: Overall no acute pain issues, she is still having L sacral soreness and tightness intermittently so this will be our focus of treatment today.   ________  ReEXAM 4/28/23: Magda has not been in for treatment since December 2022. Therefore it is been approximately 4 months since our last treatment/encounter. She explains that about 4 to 6 weeks ago she started to notice a pain in the right side of her middle back near her right shoulder blade. This has gotten slightly worse over the last few weeks and she has intense pain with sneezing and certain movements. Occasionally she will feel tingling and numbness into the arm and feels as if there may be a pinched nerve. She has tried some self massage without being able to identify a specific location of pain. She is nervous that she may have a more serious condition but wanted to see if it was a musculoskeletal.  ________   INITIAL INTAKE/SUBJECTIVE 10/24/22: She was referred here by her primary care physician, Dr. Quiana Aleman at her most recent wellness visit in August 2022. She explains that her lower back pain started approximately 8 years ago after she  "had her first child. She explains that she did have an epidural and a  section and is unsure if this was the trigger or onset of the back pain. She also had a miscarriage with a ruptured uterus which required extensive repair before having her second child about 4 years ago with another  section.     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 She was seeing his local chiropractor 2-3 times a week for quite some time and found that it did help her moderately however she felt that the massage helped even more. She also received x-rays around this timeframe which was a couple of years ago.     Today she has pain into primary locations the neck and upper back and the lower back and left glute and leg.There is no headaches currently and she does not report any significant numbness and tingling in the hands or feet. There is also no noticeable weakness or change in her gait etc. she additionally has pain in the left glute and left hamstring region. The used to be pain in the calf but this has since resolved.     She does not recall any specific physical trauma. She does deal with PTSD after a work issue. She is currently working with her therapist and her primary care physician taking Xanax as needed for panic attacks and Zoloft for her anxiety and depression. She also is currently being treated for ADHD.     Review of Systems   Constitutional:  Negative for chills and unexpected weight change.   HENT:  Negative for congestion and tinnitus.    Eyes:  Negative for pain.   Respiratory:  Negative for apnea.    Cardiovascular:  Negative for chest pain.   Gastrointestinal:  Negative for abdominal pain and vomiting.   Endocrine: Negative for cold intolerance and heat intolerance.   Genitourinary:  Negative for difficulty urinating and " enuresis.        +    Allergic/Immunologic: Negative for immunocompromised state.   Neurological:  Negative for dizziness.   Psychiatric/Behavioral:  Negative for agitation.         + PTSD, ADHD, anxiety and depression     Objective   Observation : normal gait and forward head posture    Physical Exam  Examination findings (palpation & ROM): Tenderness, hypertonicity and trigger points palpated throughout the rhomboids, lower traps, bilateral thoracic/lower lumbar paraspinals, bilateral SI joints.  Mild tenderness to palpation with hypertonicity throughout the bilateral upper trapezii, middle trapezius and cervical/upper thoracic paraspinals.  Left greater than right hip flexor tightness and psoas tightness.    Segmental joint dysfunction was identified in the following areas using motion palpation and/or pain provocation assessment:  Cervical: C0-C3 (Supine)   Thoracic: T4-T11 (Prone)  Lumbar: L3-L5 (Side-lying)   Sacrum: L5/S1 and left SI (Drop)    Today's treatment:  Performed spinal manipulation to the regions of segmental dysfunction identified on examination using age-appropriate and injury specific force, and manual diversified technique. Technique Used: Diversified CMT and manual traction.    Performed (brief) soft tissue manipulation including IASTM (instrument assisted soft tissue mobilization), MFR (Myofacial Release) and IC (ischemic compression) to the hypertonic paraspinal muscles including bilateral thoracic paraspinals, bilateral rhomboids, middle trapezius, lumbar paraspinals, upper trapezius musculature to patient tolerance (Start time 1:40 PM, End Time 1:50 PM). < 1 unit    Treatment Plan:   The patient and I discussed the risks and benefits of chiropractic care. Based on the patient's subjective complaints along with the examination findings, it is advised that a course of chiropractic treatment by initiated. Consent for care was given both written and orally by the patient. The patient  tolerated today's treatment with little or no additional discomfort and was instructed to contact the office for questions or concerns.     Treatment Frequency: Will see/treat patient every 2-4 weeks as therapeutic benefit is sustained, then frequency will be reduced to as needed for symptom management or as needed for acute flare-ups/exacerbation.     Please note: Voice-to-text software was used when completing this note.  While the note was proofread, portions may include grammatical errors.  Please contact me with any questions/concerns as it relates to these types of errors.

## 2024-09-09 ENCOUNTER — HOSPITAL ENCOUNTER (OUTPATIENT)
Dept: RADIOLOGY | Facility: CLINIC | Age: 49
Discharge: HOME | End: 2024-09-09
Payer: COMMERCIAL

## 2024-09-09 VITALS — WEIGHT: 132 LBS | HEIGHT: 65 IN | BODY MASS INDEX: 21.99 KG/M2

## 2024-09-09 DIAGNOSIS — Z12.31 SCREENING MAMMOGRAM FOR BREAST CANCER: ICD-10-CM

## 2024-09-09 PROCEDURE — 77067 SCR MAMMO BI INCL CAD: CPT

## 2024-09-25 ENCOUNTER — APPOINTMENT (OUTPATIENT)
Dept: PRIMARY CARE | Facility: CLINIC | Age: 49
End: 2024-09-25
Payer: COMMERCIAL

## 2024-09-25 ENCOUNTER — APPOINTMENT (OUTPATIENT)
Dept: INTEGRATIVE MEDICINE | Facility: CLINIC | Age: 49
End: 2024-09-25

## 2024-09-25 VITALS
OXYGEN SATURATION: 99 % | SYSTOLIC BLOOD PRESSURE: 106 MMHG | BODY MASS INDEX: 21.99 KG/M2 | RESPIRATION RATE: 16 BRPM | WEIGHT: 132 LBS | TEMPERATURE: 99.1 F | DIASTOLIC BLOOD PRESSURE: 68 MMHG | HEART RATE: 87 BPM | HEIGHT: 65 IN

## 2024-09-25 DIAGNOSIS — N95.1 PERIMENOPAUSAL: ICD-10-CM

## 2024-09-25 DIAGNOSIS — Z00.00 HEALTHCARE MAINTENANCE: Primary | ICD-10-CM

## 2024-09-25 DIAGNOSIS — L98.9 SKIN LESION: ICD-10-CM

## 2024-09-25 LAB
POC APPEARANCE, URINE: CLEAR
POC BILIRUBIN, URINE: NEGATIVE
POC BLOOD, URINE: NEGATIVE
POC COLOR, URINE: YELLOW
POC GLUCOSE, URINE: NEGATIVE MG/DL
POC KETONES, URINE: NEGATIVE MG/DL
POC LEUKOCYTES, URINE: NEGATIVE
POC NITRITE,URINE: NEGATIVE
POC PH, URINE: 6 PH
POC PROTEIN, URINE: NEGATIVE MG/DL
POC SPECIFIC GRAVITY, URINE: 1.01
POC UROBILINOGEN, URINE: 0.2 EU/DL

## 2024-09-25 PROCEDURE — 81002 URINALYSIS NONAUTO W/O SCOPE: CPT | Performed by: FAMILY MEDICINE

## 2024-09-25 PROCEDURE — 99396 PREV VISIT EST AGE 40-64: CPT | Performed by: FAMILY MEDICINE

## 2024-09-25 PROCEDURE — 1036F TOBACCO NON-USER: CPT | Performed by: FAMILY MEDICINE

## 2024-09-25 PROCEDURE — 3008F BODY MASS INDEX DOCD: CPT | Performed by: FAMILY MEDICINE

## 2024-09-25 NOTE — PROGRESS NOTES
"Current Outpatient Medications   Medication Sig Dispense Refill    ALPRAZolam (Xanax) 0.25 mg tablet Take by mouth per week.      amphetamine-dextroamphetamine XR (Adderall XR) 5 mg 24 hr capsule Take 2 capsules (10 mg) by mouth once daily.      azelastine (Astelin) 137 mcg (0.1 %) nasal spray Administer 1 spray into each nostril 2 times a day. Use in each nostril as directed 30 mL 0    buPROPion XL (Wellbutrin XL) 150 mg 24 hr tablet       ketoconazole (NIZOral) 2 % shampoo WASH SCALP 3 TIMES PER WEEK      multivitamin-min-iron-FA-vit K (Adults Multivitamin) 18 mg iron-400 mcg-25 mcg tablet Take 1 tablet by mouth once daily.      albuterol (ProAir HFA) 90 mcg/actuation inhaler Inhale 2 puffs every 4 hours if needed for wheezing or shortness of breath. 8.5 g 0    sertraline (Zoloft) 100 mg tablet Take 1 tablet (100 mg) by mouth once daily.       No current facility-administered medications for this visit.   Subjective   Patient ID: Magda Hough is a 49 y.o. female who presents for Annual Exam.    Dentist and Eye Doctor appointments: UTD  Immunizations: due for flu shots   Diet: eats healthy  Exercise: cardio/strength   Supplements: mvi  Menstrual Cycles:irregular - skipping  Sexually Active: Yes, tubal, no std concerns  Pregnancy History:  Cancer Screening:    Cervical:  - wnl, negative hpv   Breast: due   Colon:  - negative   Skin: wears sunscreen, new mole on the forehead    DEXA:N/A          HPI     Review of Systems    Objective   /68   Pulse 87   Temp 37.3 °C (99.1 °F)   Resp 16   Ht 1.651 m (5' 5\")   Wt 59.9 kg (132 lb)   SpO2 99%   BMI 21.97 kg/m²     Physical Exam  Constitutional:       General: She is not in acute distress.     Appearance: She is well-developed. She is not diaphoretic.   HENT:      Head: Normocephalic and atraumatic.      Right Ear: Tympanic membrane normal.      Left Ear: Tympanic membrane normal.      Nose: Nose normal.      Mouth/Throat:      Mouth: Mucous " membranes are moist.   Eyes:      General: No scleral icterus.     Pupils: Pupils are equal, round, and reactive to light.   Neck:      Thyroid: No thyromegaly.      Vascular: No JVD.   Cardiovascular:      Rate and Rhythm: Normal rate and regular rhythm.      Heart sounds: Normal heart sounds. No murmur heard.     No friction rub. No gallop.   Pulmonary:      Effort: Pulmonary effort is normal. No respiratory distress.      Breath sounds: Normal breath sounds. No wheezing or rales.   Chest:      Chest wall: No tenderness.   Breasts:     Right: Normal.      Left: Normal.   Abdominal:      General: Bowel sounds are normal. There is no distension.      Palpations: Abdomen is soft. There is no mass.      Tenderness: There is no abdominal tenderness. There is no rebound.   Musculoskeletal:         General: Normal range of motion.      Cervical back: Normal range of motion and neck supple.   Lymphadenopathy:      Cervical: No cervical adenopathy.   Skin:     General: Skin is warm and dry.   Neurological:      General: No focal deficit present.      Mental Status: She is alert and oriented to person, place, and time.      Deep Tendon Reflexes: Reflexes normal.   Psychiatric:         Mood and Affect: Mood normal.         Behavior: Behavior normal.         Assessment/Plan   Diagnoses and all orders for this visit:  Healthcare maintenance  -     POCT UA (nonautomated) manually resulted  -     CBC; Future  -     Comprehensive Metabolic Panel; Future  -     Lipid Panel; Future  -     TSH with reflex to Free T4 if abnormal; Future  -     Vitamin D 25 hydroxy; Future  Skin lesion  -     Referral to Dermatology  Perimenopausal

## 2024-09-25 NOTE — PATIENT INSTRUCTIONS
Today we performed your Annual Physical Exam.  Your preventative health care, labs and vaccinations have been reviewed and are up to date.      Flu shot and covid boosters are recommended.     I have referred you to a new dermatologist for your skin lesion on your face that is new and overall skin check     Follow up in 1 year for your Complete Physical Exam

## 2024-10-07 ENCOUNTER — APPOINTMENT (OUTPATIENT)
Dept: INTEGRATIVE MEDICINE | Facility: CLINIC | Age: 49
End: 2024-10-07
Payer: COMMERCIAL

## 2024-10-07 DIAGNOSIS — M99.04 SEGMENTAL AND SOMATIC DYSFUNCTION OF SACRAL REGION: ICD-10-CM

## 2024-10-07 DIAGNOSIS — M99.02 SEGMENTAL AND SOMATIC DYSFUNCTION OF THORACIC REGION: ICD-10-CM

## 2024-10-07 DIAGNOSIS — G89.29 CHRONIC BILATERAL LOW BACK PAIN WITHOUT SCIATICA: ICD-10-CM

## 2024-10-07 DIAGNOSIS — M54.2 NECK PAIN: ICD-10-CM

## 2024-10-07 DIAGNOSIS — M79.9 POSTURAL STRAIN: ICD-10-CM

## 2024-10-07 DIAGNOSIS — M99.03 SEGMENTAL AND SOMATIC DYSFUNCTION OF LUMBAR REGION: Primary | ICD-10-CM

## 2024-10-07 DIAGNOSIS — M99.01 SEGMENTAL AND SOMATIC DYSFUNCTION OF CERVICAL REGION: ICD-10-CM

## 2024-10-07 DIAGNOSIS — M54.50 CHRONIC BILATERAL LOW BACK PAIN WITHOUT SCIATICA: ICD-10-CM

## 2024-10-07 DIAGNOSIS — M99.05 SEGMENTAL AND SOMATIC DYSFUNCTION OF PELVIC REGION: ICD-10-CM

## 2024-10-07 DIAGNOSIS — M79.10 MYALGIA: ICD-10-CM

## 2024-10-07 PROCEDURE — 98941 CHIROPRACT MANJ 3-4 REGIONS: CPT | Performed by: CHIROPRACTOR

## 2024-10-07 NOTE — PROGRESS NOTES
"Subjective   Patient ID: Magda Hough is a 49 y.o. female who presents 2024 for chiropractic care.    Medicare (11)     Today, the patient rates their degree of pain as a 4 out of 10 on the numeric pain rating scale.     Magda Hough returns for continued chiropractic care. She was previously in the care of my colleague, Dr. Beltran. She reports that she continues to do well with intermittent episodes of mild lumbar pain/stiffness. The patient denies any changes in health since her last encounter and will follow up as scheduled.    _________________________________________________  (SP)INITIAL INTAKE/SUBJECTIVE 10/24/22: She was referred here by her primary care physician, Dr. Quiana Aleman at her most recent wellness visit in 2022. She explains that her lower back pain started approximately 8 years ago after she had her first child. She explains that she did have an epidural and a  section and is unsure if this was the trigger or onset of the back pain. She also had a miscarriage with a ruptured uterus which required extensive repair before having her second child about 4 years ago with another  section.     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 She was seeing his local chiropractor 2-3 times a week for quite some time and found that it did help her moderately however she felt that the massage helped even more. She also received x-rays around this timeframe which was a couple of years ago.     Today she has pain into primary locations the neck and upper back and the lower back and left glute and leg.There is no headaches currently and she does not report any significant numbness and tingling in the hands or feet. There is also no noticeable weakness or change in her gait etc. she additionally has " pain in the left glute and left hamstring region. The used to be pain in the calf but this has since resolved.     She does not recall any specific physical trauma. She does deal with PTSD after a work issue. She is currently working with her therapist and her primary care physician taking Xanax as needed for panic attacks and Zoloft for her anxiety and depression. She also is currently being treated for ADHD.       Objective   Physical Exam  Neurological:      General: No focal deficit present.      Mental Status: She is alert and oriented to person, place, and time.      Cranial Nerves: No dysarthria or facial asymmetry.      Sensory: Sensation is intact.      Motor: Motor function is intact.      Coordination: Coordination is intact.      Gait: Gait is intact.       Palpation of the following region(s) revealed:  Cervical: Scalenes bilateral, muscular hypertonicity.  Upper trapezius bilateral, muscular hypertonicity.  Levator scapulae bilateral, muscular hypertonicity.  Cervical paraspinals bilateral, muscular hypertonicity.  Thoracic: Middle trapezius bilateral, muscular hypertonicity.  Lower trapezius bilateral, muscular hypertonicity.  Rhomboids bilateral, muscular hypertonicity.  Lumbar: Lumbar paraspinals bilateral, muscular hypertonicity.  Gluteal bilateral, muscular hypertonicity.  Piriformis bilateral, muscular hypertonicity.    Segmental Joint(s): Segmental joint dysfunction was assessed with motion palpation and is identified in the following areas:  Cervical : C2 C4  Thoracic : T4, T5, and T8  Lumbopelvic / Sacral SIJ : L4, L5/S1, and R SIJ    Assessment/Plan   Today's Treatment Included: Spinal manipulation to the following regions of segmental dysfunction identified on examination, using age-appropriate and injury specific force. Segmental Joint(s) Cervical : C2 C4 Segmental Joint(s) Thoracic : T4, T5, and T8 Segmental Joint(s) Lumbopelvic/Sacral SIJ : L4, L5/S1, and R SIJ  Chiropractic manipulation  treatment techniques utilized: Diversified CMT, Pelvic drop table technique, Pelvic blocking technique, and Flexion-distraction technique  Soft tissue mobilization techniques utilized during treatment: Pin and Stretch, IASTM/Graston, and Ischemic compression    Soft-tissue mobilization was performed in the following areas:   Cervical paraspinal mm. bilateral  Thoracic Paraspinal mm. bilateral, Middle Trapezius bilateral, Lower Trapezius bilateral, and Rhomboids bilateral  Lumbar Paraspinal mm. bilateral, Gluteal mm. Glute. Max.  and Glute. Med. bilateral, and Piriformis bilateral    The patient tolerated today's treatment with little or no additional discomfort and was instructed to contact the office for questions or concerns.

## 2024-10-28 ENCOUNTER — LAB (OUTPATIENT)
Dept: LAB | Facility: LAB | Age: 49
End: 2024-10-28
Payer: COMMERCIAL

## 2024-10-28 ENCOUNTER — APPOINTMENT (OUTPATIENT)
Dept: INTEGRATIVE MEDICINE | Facility: CLINIC | Age: 49
End: 2024-10-28
Payer: COMMERCIAL

## 2024-10-28 DIAGNOSIS — M54.50 CHRONIC BILATERAL LOW BACK PAIN WITHOUT SCIATICA: ICD-10-CM

## 2024-10-28 DIAGNOSIS — G89.29 CHRONIC BILATERAL LOW BACK PAIN WITHOUT SCIATICA: ICD-10-CM

## 2024-10-28 DIAGNOSIS — Z00.00 HEALTHCARE MAINTENANCE: ICD-10-CM

## 2024-10-28 DIAGNOSIS — M99.03 SEGMENTAL AND SOMATIC DYSFUNCTION OF LUMBAR REGION: Primary | ICD-10-CM

## 2024-10-28 DIAGNOSIS — M99.05 SEGMENTAL AND SOMATIC DYSFUNCTION OF PELVIC REGION: ICD-10-CM

## 2024-10-28 DIAGNOSIS — M99.02 SEGMENTAL AND SOMATIC DYSFUNCTION OF THORACIC REGION: ICD-10-CM

## 2024-10-28 DIAGNOSIS — M99.04 SEGMENTAL AND SOMATIC DYSFUNCTION OF SACRAL REGION: ICD-10-CM

## 2024-10-28 DIAGNOSIS — M79.9 POSTURAL STRAIN: ICD-10-CM

## 2024-10-28 DIAGNOSIS — M99.01 SEGMENTAL AND SOMATIC DYSFUNCTION OF CERVICAL REGION: ICD-10-CM

## 2024-10-28 DIAGNOSIS — M54.2 NECK PAIN: ICD-10-CM

## 2024-10-28 DIAGNOSIS — M79.10 MYALGIA: ICD-10-CM

## 2024-10-28 LAB
25(OH)D3 SERPL-MCNC: 35 NG/ML (ref 30–100)
ALBUMIN SERPL BCP-MCNC: 4.5 G/DL (ref 3.4–5)
ALP SERPL-CCNC: 49 U/L (ref 33–110)
ALT SERPL W P-5'-P-CCNC: 12 U/L (ref 7–45)
ANION GAP SERPL CALC-SCNC: 8 MMOL/L (ref 10–20)
AST SERPL W P-5'-P-CCNC: 14 U/L (ref 9–39)
BILIRUB SERPL-MCNC: 0.6 MG/DL (ref 0–1.2)
BUN SERPL-MCNC: 6 MG/DL (ref 6–23)
CALCIUM SERPL-MCNC: 9.9 MG/DL (ref 8.6–10.6)
CHLORIDE SERPL-SCNC: 103 MMOL/L (ref 98–107)
CHOLEST SERPL-MCNC: 229 MG/DL (ref 0–199)
CHOLESTEROL/HDL RATIO: 4.3
CO2 SERPL-SCNC: 34 MMOL/L (ref 21–32)
CREAT SERPL-MCNC: 0.71 MG/DL (ref 0.5–1.05)
EGFRCR SERPLBLD CKD-EPI 2021: >90 ML/MIN/1.73M*2
ERYTHROCYTE [DISTWIDTH] IN BLOOD BY AUTOMATED COUNT: 11.8 % (ref 11.5–14.5)
GLUCOSE SERPL-MCNC: 87 MG/DL (ref 74–99)
HCT VFR BLD AUTO: 44.4 % (ref 36–46)
HDLC SERPL-MCNC: 53.1 MG/DL
HGB BLD-MCNC: 14.7 G/DL (ref 12–16)
LDLC SERPL CALC-MCNC: 152 MG/DL
MCH RBC QN AUTO: 29.6 PG (ref 26–34)
MCHC RBC AUTO-ENTMCNC: 33.1 G/DL (ref 32–36)
MCV RBC AUTO: 90 FL (ref 80–100)
NON HDL CHOLESTEROL: 176 MG/DL (ref 0–149)
NRBC BLD-RTO: 0 /100 WBCS (ref 0–0)
PLATELET # BLD AUTO: 212 X10*3/UL (ref 150–450)
POTASSIUM SERPL-SCNC: 4.2 MMOL/L (ref 3.5–5.3)
PROT SERPL-MCNC: 7.3 G/DL (ref 6.4–8.2)
RBC # BLD AUTO: 4.96 X10*6/UL (ref 4–5.2)
SODIUM SERPL-SCNC: 141 MMOL/L (ref 136–145)
TRIGL SERPL-MCNC: 121 MG/DL (ref 0–149)
TSH SERPL-ACNC: 1.06 MIU/L (ref 0.44–3.98)
VLDL: 24 MG/DL (ref 0–40)
WBC # BLD AUTO: 3 X10*3/UL (ref 4.4–11.3)

## 2024-10-28 PROCEDURE — 80061 LIPID PANEL: CPT

## 2024-10-28 PROCEDURE — 85027 COMPLETE CBC AUTOMATED: CPT

## 2024-10-28 PROCEDURE — 80053 COMPREHEN METABOLIC PANEL: CPT

## 2024-10-28 PROCEDURE — 98942 CHIROPRACTIC MANJ 5 REGIONS: CPT | Performed by: CHIROPRACTOR

## 2024-10-28 PROCEDURE — 82306 VITAMIN D 25 HYDROXY: CPT

## 2024-10-28 PROCEDURE — 36415 COLL VENOUS BLD VENIPUNCTURE: CPT

## 2024-10-28 PROCEDURE — 84443 ASSAY THYROID STIM HORMONE: CPT

## 2024-10-29 PROBLEM — R39.15 URINARY URGENCY: Status: RESOLVED | Noted: 2023-10-16 | Resolved: 2024-10-29

## 2024-10-29 PROBLEM — M79.652 PAIN OF LEFT THIGH: Status: RESOLVED | Noted: 2023-10-15 | Resolved: 2024-10-29

## 2024-10-29 PROBLEM — J40 BRONCHITIS: Status: RESOLVED | Noted: 2023-08-18 | Resolved: 2024-10-29

## 2024-10-29 PROBLEM — R39.12 WEAK URINE STREAM: Status: RESOLVED | Noted: 2023-10-16 | Resolved: 2024-10-29

## 2024-10-29 PROBLEM — R35.0 URINARY FREQUENCY: Status: RESOLVED | Noted: 2023-10-16 | Resolved: 2024-10-29

## 2024-10-29 PROBLEM — M79.9 POSTURAL STRAIN: Status: RESOLVED | Noted: 2023-10-15 | Resolved: 2024-10-29

## 2024-10-29 PROBLEM — M79.662 PAIN OF LEFT CALF: Status: RESOLVED | Noted: 2023-10-15 | Resolved: 2024-10-29

## 2025-01-09 ENCOUNTER — ALLIED HEALTH (OUTPATIENT)
Dept: INTEGRATIVE MEDICINE | Facility: CLINIC | Age: 50
End: 2025-01-09
Payer: COMMERCIAL

## 2025-01-09 DIAGNOSIS — M25.551 ACUTE RIGHT HIP PAIN: Primary | ICD-10-CM

## 2025-01-09 DIAGNOSIS — M99.02 SEGMENTAL AND SOMATIC DYSFUNCTION OF THORACIC REGION: ICD-10-CM

## 2025-01-09 DIAGNOSIS — M79.10 MYALGIA: ICD-10-CM

## 2025-01-09 DIAGNOSIS — M99.05 SEGMENTAL AND SOMATIC DYSFUNCTION OF PELVIC REGION: ICD-10-CM

## 2025-01-09 DIAGNOSIS — M79.9 POSTURAL STRAIN: ICD-10-CM

## 2025-01-09 DIAGNOSIS — M99.03 SEGMENTAL AND SOMATIC DYSFUNCTION OF LUMBAR REGION: ICD-10-CM

## 2025-01-09 DIAGNOSIS — M99.01 SEGMENTAL AND SOMATIC DYSFUNCTION OF CERVICAL REGION: ICD-10-CM

## 2025-01-09 PROCEDURE — 98941 CHIROPRACT MANJ 3-4 REGIONS: CPT

## 2025-01-09 NOTE — PROGRESS NOTES
"Subjective   Patient ID: Magda Hough is a 49 y.o. female who presents 2025 for chiropractic care.     (1) [facility]    Today, the patient rates their degree of pain as a 6 out of 10 on the numeric pain rating scale.     Magda returns today for continued chiropractic care. She has been doing well in upper back and neck, but has a new pain in her right hip that started when she was trying to shake snow off of her boot recently. The pain is very lateral and close to hip joint proper, and she has been noticing it while laying in bed at night and walking around as well. She has not seen any other providers for this pain.     ___________________________________________  Last visit - Dr. Fernandez (10/28/24):  Magda Hough returns for continued chiropractic care. She states that she has been doing well. She states that yesterday, she was putting chairs away at an event and felt a pull in her neck and upper back. She states that her hip feels a little rotated as well. The patient denies any changes in health since her last encounter and will follow up as scheduled.    _________________________________________________  (SP)INITIAL INTAKE/SUBJECTIVE 10/24/22: She was referred here by her primary care physician, Dr. Quiana Aleman at her most recent wellness visit in 2022. She explains that her lower back pain started approximately 8 years ago after she had her first child. She explains that she did have an epidural and a  section and is unsure if this was the trigger or onset of the back pain. She also had a miscarriage with a ruptured uterus which required extensive repair before having her second child about 4 years ago with another  section.     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 She was seeing his local " chiropractor 2-3 times a week for quite some time and found that it did help her moderately however she felt that the massage helped even more. She also received x-rays around this timeframe which was a couple of years ago.     Today she has pain into primary locations the neck and upper back and the lower back and left glute and leg.There is no headaches currently and she does not report any significant numbness and tingling in the hands or feet. There is also no noticeable weakness or change in her gait etc. she additionally has pain in the left glute and left hamstring region. The used to be pain in the calf but this has since resolved.     She does not recall any specific physical trauma. She does deal with PTSD after a work issue. She is currently working with her therapist and her primary care physician taking Xanax as needed for panic attacks and Zoloft for her anxiety and depression. She also is currently being treated for ADHD.       Objective   Physical Exam  Neurological:      General: No focal deficit present.      Mental Status: She is alert and oriented to person, place, and time.      Cranial Nerves: No dysarthria or facial asymmetry.      Sensory: Sensation is intact.      Motor: Motor function is intact.      Coordination: Coordination is intact.      Gait: Gait is intact.     Palpation of the following region(s) revealed:  Cervical: Upper trapezius bilateral, muscular hypertonicity.  Levator scapulae bilateral, muscular hypertonicity.  Cervical paraspinals bilateral, muscular hypertonicity.  Thoracic: Middle trapezius bilateral, muscular hypertonicity.  Lower trapezius bilateral, muscular hypertonicity.  Rhomboids bilateral, muscular hypertonicity.  Lumbar: Lumbar paraspinals bilateral, muscular hypertonicity.  Gluteal right, hypertonicity and tenderness.  Piriformis right, hypertonicity and tenderness.  Psoas right, hypertonicity and tenderness.    Segmental Joint(s): Segmental joint dysfunction  was assessed with motion palpation and is identified in the following areas:  Cervical : C2 C4  Thoracic : T4, T5, T6, and T7  Lumbopelvic / Sacral SIJ : L3, L4, L5/S1, R SIJ, and L SIJ    Assessment/Plan   Today's Treatment Included: Spinal manipulation to the following regions of segmental dysfunction identified on examination, using age-appropriate and injury specific force. Segmental Joint(s) Cervical : C2 C4 Segmental Joint(s) Thoracic : T4, T5, T6, and T7 Segmental Joint(s) Lumbopelvic/Sacral SIJ : L3, L4, L5/S1, R SIJ, and L SIJ  Chiropractic manipulation treatment techniques utilized: Diversified CMT, Pelvic drop table technique, and Flexion-distraction technique  Soft tissue mobilization techniques utilized during treatment: Active Release Technique, Pin and Stretch, and Ischemic compression    Soft-tissue mobilization was performed in the following areas:   Upper Trapezius bilateral and Levator Scap. bilateral  Thoracic Paraspinal mm. bilateral, Middle Trapezius bilateral, Lower Trapezius bilateral, and Rhomboids bilateral  Lumbar Paraspinal mm. bilateral, Gluteal mm. Glute. Max.  and Glute. Med. right, Piriformis right, and Psoas right    Patient does not like dry-needling.    F/U in 1-2 weeks, depending on response to today's treatment.     The patient tolerated today's treatment with little or no additional discomfort and was instructed to contact the office for questions or concerns.

## 2025-01-15 ENCOUNTER — APPOINTMENT (OUTPATIENT)
Dept: INTEGRATIVE MEDICINE | Facility: CLINIC | Age: 50
End: 2025-01-15
Payer: COMMERCIAL

## 2025-01-15 ENCOUNTER — ALLIED HEALTH (OUTPATIENT)
Dept: INTEGRATIVE MEDICINE | Facility: CLINIC | Age: 50
End: 2025-01-15
Payer: COMMERCIAL

## 2025-01-15 DIAGNOSIS — M99.01 SEGMENTAL AND SOMATIC DYSFUNCTION OF CERVICAL REGION: ICD-10-CM

## 2025-01-15 DIAGNOSIS — M79.9 POSTURAL STRAIN: ICD-10-CM

## 2025-01-15 DIAGNOSIS — G89.29 CHRONIC HIP PAIN, BILATERAL: ICD-10-CM

## 2025-01-15 DIAGNOSIS — M99.02 SEGMENTAL AND SOMATIC DYSFUNCTION OF THORACIC REGION: ICD-10-CM

## 2025-01-15 DIAGNOSIS — M54.50 CHRONIC BILATERAL LOW BACK PAIN WITHOUT SCIATICA: ICD-10-CM

## 2025-01-15 DIAGNOSIS — M25.551 CHRONIC HIP PAIN, BILATERAL: ICD-10-CM

## 2025-01-15 DIAGNOSIS — G89.29 CHRONIC BILATERAL LOW BACK PAIN WITHOUT SCIATICA: ICD-10-CM

## 2025-01-15 DIAGNOSIS — M99.05 SEGMENTAL AND SOMATIC DYSFUNCTION OF PELVIC REGION: Primary | ICD-10-CM

## 2025-01-15 DIAGNOSIS — M99.03 SEGMENTAL AND SOMATIC DYSFUNCTION OF LUMBAR REGION: ICD-10-CM

## 2025-01-15 DIAGNOSIS — M79.10 MYALGIA: ICD-10-CM

## 2025-01-15 DIAGNOSIS — M25.552 CHRONIC HIP PAIN, BILATERAL: ICD-10-CM

## 2025-01-15 PROCEDURE — 98941 CHIROPRACT MANJ 3-4 REGIONS: CPT

## 2025-01-15 NOTE — PROGRESS NOTES
"Subjective   Patient ID: Magda Hough is a 49 y.o. female who presents January 15, 2025 for chiropractic care.     (2) [facility]    Today, the patient rates their degree of pain as a 6 out of 10 on the numeric pain rating scale.     Magda reports her right hip feeling better since last visit. She is still noticing some tightness and pain but not as bad. She also had a massage today prior to her appointment. Notes more soreness now on left side hip in addition to mild soreness on right.   _____________________________________________  Last visit (25):  Magda returns today for continued chiropractic care. She has been doing well in upper back and neck, but has a new pain in her right hip that started when she was trying to shake snow off of her boot recently. The pain is very lateral and close to hip joint proper, and she has been noticing it while laying in bed at night and walking around as well. She has not seen any other providers for this pain.   _________________________________________________  (SP)INITIAL INTAKE/SUBJECTIVE 10/24/22: She was referred here by her primary care physician, Dr. Quiana Aleman at her most recent wellness visit in 2022. She explains that her lower back pain started approximately 8 years ago after she had her first child. She explains that she did have an epidural and a  section and is unsure if this was the trigger or onset of the back pain. She also had a miscarriage with a ruptured uterus which required extensive repair before having her second child about 4 years ago with another  section.     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 She was seeing his local chiropractor 2-3 times a week for quite some time and found that it did help her moderately however she felt that " the massage helped even more. She also received x-rays around this timeframe which was a couple of years ago.     Today she has pain into primary locations the neck and upper back and the lower back and left glute and leg.There is no headaches currently and she does not report any significant numbness and tingling in the hands or feet. There is also no noticeable weakness or change in her gait etc. she additionally has pain in the left glute and left hamstring region. The used to be pain in the calf but this has since resolved.     She does not recall any specific physical trauma. She does deal with PTSD after a work issue. She is currently working with her therapist and her primary care physician taking Xanax as needed for panic attacks and Zoloft for her anxiety and depression. She also is currently being treated for ADHD.       Objective   Physical Exam  Neurological:      General: No focal deficit present.      Mental Status: She is alert and oriented to person, place, and time.      Cranial Nerves: No dysarthria or facial asymmetry.      Sensory: Sensation is intact.      Motor: Motor function is intact.      Coordination: Coordination is intact.      Gait: Gait is intact.       Palpation of the following region(s) revealed:  Cervical: Upper trapezius bilateral, muscular hypertonicity.  Levator scapulae bilateral, muscular hypertonicity.  Thoracic: Middle trapezius bilateral, muscular hypertonicity.  Lower trapezius bilateral, muscular hypertonicity.  Rhomboids bilateral, muscular hypertonicity.  Lumbar: Lumbar paraspinals bilateral, muscular hypertonicity.  Gluteal bilateral, muscular hypertonicity.  Piriformis bilateral, muscular hypertonicity.    Segmental Joint(s): Segmental joint dysfunction was assessed with motion palpation and is identified in the following areas:  Cervical : C4  Thoracic : T4, T5, T6, and T7  Lumbopelvic / Sacral SIJ : L3, L4, L5/S1, R SIJ, and L SIJ    Assessment/Plan   Today's  Treatment Included: Spinal manipulation to the following regions of segmental dysfunction identified on examination, using age-appropriate and injury specific force. Segmental Joint(s) Cervical : C4 Segmental Joint(s) Thoracic : T4, T5, T6, and T7 Segmental Joint(s) Lumbopelvic/Sacral SIJ : L3, L4, L5/S1, R SIJ, and L SIJ  Chiropractic manipulation treatment techniques utilized: Diversified CMT, Pelvic drop table technique, and Flexion-distraction technique  Soft tissue mobilization techniques utilized during treatment: Active Release Technique, Pin and Stretch, and Ischemic compression    Soft-tissue mobilization was performed in the following areas:   Upper Trapezius bilateral and Levator Scap. bilateral  Thoracic Paraspinal mm. bilateral, Middle Trapezius bilateral, Lower Trapezius bilateral, and Rhomboids bilateral  Lumbar Paraspinal mm. bilateral, Gluteal mm. Glute. Max.  and Glute. Med. bilateral, Piriformis bilateral, and Psoas bilateral    Patient does not like dry-needling.    F/U in 1-2 weeks, depending on response to today's treatment.     The patient tolerated today's treatment with little or no additional discomfort and was instructed to contact the office for questions or concerns.

## 2025-01-22 ENCOUNTER — APPOINTMENT (OUTPATIENT)
Dept: INTEGRATIVE MEDICINE | Facility: CLINIC | Age: 50
End: 2025-01-22
Payer: COMMERCIAL

## 2025-01-29 ENCOUNTER — TELEPHONE (OUTPATIENT)
Facility: CLINIC | Age: 50
End: 2025-01-29
Payer: COMMERCIAL

## 2025-03-26 ENCOUNTER — TELEPHONE (OUTPATIENT)
Facility: CLINIC | Age: 50
End: 2025-03-26
Payer: COMMERCIAL

## 2025-03-26 DIAGNOSIS — N95.1 PERIMENOPAUSAL: Primary | ICD-10-CM

## 2025-03-26 NOTE — TELEPHONE ENCOUNTER
Pt called and stated that she talked minimally about hormone replacement therapy and she was told she would need to go to OB/GYN.  She would like a referral for that if possible.

## 2025-04-16 ENCOUNTER — APPOINTMENT (OUTPATIENT)
Dept: DERMATOLOGY | Facility: CLINIC | Age: 50
End: 2025-04-16
Payer: COMMERCIAL

## 2025-06-10 ENCOUNTER — OFFICE VISIT (OUTPATIENT)
Dept: PRIMARY CARE | Facility: CLINIC | Age: 50
End: 2025-06-10
Payer: COMMERCIAL

## 2025-06-10 VITALS
SYSTOLIC BLOOD PRESSURE: 108 MMHG | BODY MASS INDEX: 20.8 KG/M2 | RESPIRATION RATE: 21 BRPM | TEMPERATURE: 98.5 F | HEART RATE: 91 BPM | DIASTOLIC BLOOD PRESSURE: 72 MMHG | OXYGEN SATURATION: 98 % | WEIGHT: 125 LBS

## 2025-06-10 DIAGNOSIS — H69.90 DYSFUNCTION OF EUSTACHIAN TUBE, UNSPECIFIED LATERALITY: Primary | ICD-10-CM

## 2025-06-10 PROCEDURE — 99213 OFFICE O/P EST LOW 20 MIN: CPT | Performed by: NURSE PRACTITIONER

## 2025-06-10 RX ORDER — FLUTICASONE PROPIONATE 50 MCG
1 SPRAY, SUSPENSION (ML) NASAL DAILY
Qty: 16 G | Refills: 0 | Status: SHIPPED | OUTPATIENT
Start: 2025-06-10 | End: 2025-07-10

## 2025-06-10 RX ORDER — ESCITALOPRAM OXALATE 20 MG/1
20 TABLET ORAL DAILY
COMMUNITY

## 2025-06-10 RX ORDER — ESCITALOPRAM OXALATE 10 MG/1
1 TABLET ORAL
COMMUNITY
Start: 2025-04-01 | End: 2025-06-10 | Stop reason: ALTCHOICE

## 2025-06-10 RX ORDER — CETIRIZINE HYDROCHLORIDE 10 MG/1
10 TABLET ORAL DAILY
Qty: 30 TABLET | Refills: 0 | Status: SHIPPED | OUTPATIENT
Start: 2025-06-10 | End: 2025-07-10

## 2025-06-10 ASSESSMENT — ENCOUNTER SYMPTOMS
RHINORRHEA: 0
APPETITE CHANGE: 0
FATIGUE: 0
FEVER: 0
CHILLS: 0
GASTROINTESTINAL NEGATIVE: 1
CARDIOVASCULAR NEGATIVE: 1

## 2025-06-10 NOTE — PROGRESS NOTES
Patient says that for the past several weeks, she has an earache. She says that her ears hurt most at night when laying down and the pain seems to go away. She says that today, the pain was more persistent during the day. No URI symptoms.     Review of Systems   Constitutional:  Negative for appetite change, chills, fatigue and fever.   HENT:  Positive for ear pain. Negative for congestion and rhinorrhea.    Cardiovascular: Negative.    Gastrointestinal: Negative.      Objective   /72   Pulse 91   Temp 36.9 °C (98.5 °F) (Temporal)   Resp 21   Wt 56.7 kg (125 lb)   SpO2 98%   BMI 20.80 kg/m²     Physical Exam  Vitals reviewed.   Constitutional:       General: She is not in acute distress.     Appearance: Normal appearance. She is not ill-appearing or toxic-appearing.   HENT:      Head: Atraumatic.      Right Ear: Tympanic membrane, ear canal and external ear normal.      Left Ear: Tympanic membrane, ear canal and external ear normal.      Ears:      Comments: Slight serous effusion medial to both tympanic membranes bilaterally     Nose: Nose normal.      Mouth/Throat:      Mouth: Mucous membranes are moist.      Pharynx: Oropharynx is clear.   Eyes:      Conjunctiva/sclera: Conjunctivae normal.   Cardiovascular:      Rate and Rhythm: Normal rate and regular rhythm.      Heart sounds: Normal heart sounds. No murmur heard.  Pulmonary:      Effort: Pulmonary effort is normal.      Breath sounds: Normal breath sounds. No wheezing or rhonchi.   Skin:     General: Skin is warm and dry.   Neurological:      General: No focal deficit present.      Mental Status: She is alert.   Psychiatric:         Mood and Affect: Mood normal.     Assessment/Plan   Problem List Items Addressed This Visit           ICD-10-CM    ETD (eustachian tube dysfunction) - Primary H69.90    Relevant Medications    fluticasone (Flonase) 50 mcg/actuation nasal spray    cetirizine (ZyrTEC) 10 mg tablet   Discussed with patient that there are  no signs of infection and that her ear canals are free from wax. Discussed that her symptoms are consistent with eustachian tube dysfunction. Will start pt on flonase and zyrtec daily. Discussed that it does take a week or so for the medication to take effect, but patient can follow up if no better. Discussed ER for any worsening or new/concerning symptoms; she agreed.

## 2025-08-05 ENCOUNTER — APPOINTMENT (OUTPATIENT)
Dept: OBSTETRICS AND GYNECOLOGY | Facility: CLINIC | Age: 50
End: 2025-08-05
Payer: COMMERCIAL

## 2025-08-05 VITALS
HEIGHT: 65 IN | WEIGHT: 126.8 LBS | SYSTOLIC BLOOD PRESSURE: 110 MMHG | DIASTOLIC BLOOD PRESSURE: 70 MMHG | BODY MASS INDEX: 21.13 KG/M2

## 2025-08-05 DIAGNOSIS — N95.1 MENOPAUSAL SYNDROME ON HORMONE REPLACEMENT THERAPY: Primary | ICD-10-CM

## 2025-08-05 DIAGNOSIS — M62.89 PELVIC FLOOR DYSFUNCTION IN FEMALE: ICD-10-CM

## 2025-08-05 DIAGNOSIS — Z79.890 MENOPAUSAL SYNDROME ON HORMONE REPLACEMENT THERAPY: Primary | ICD-10-CM

## 2025-08-05 DIAGNOSIS — R10.2 PELVIC PAIN: ICD-10-CM

## 2025-08-05 PROCEDURE — 3008F BODY MASS INDEX DOCD: CPT | Performed by: NURSE PRACTITIONER

## 2025-08-05 PROCEDURE — 99204 OFFICE O/P NEW MOD 45 MIN: CPT | Performed by: NURSE PRACTITIONER

## 2025-08-05 RX ORDER — ESTRADIOL 0.03 MG/D
1 FILM, EXTENDED RELEASE TRANSDERMAL 2 TIMES WEEKLY
Qty: 8 PATCH | Refills: 11 | Status: SHIPPED | OUTPATIENT
Start: 2025-08-05 | End: 2025-08-06 | Stop reason: SDUPTHER

## 2025-08-05 RX ORDER — PROGESTERONE 100 MG/1
100 CAPSULE ORAL DAILY
Qty: 30 CAPSULE | Refills: 11 | Status: SHIPPED | OUTPATIENT
Start: 2025-08-05

## 2025-08-05 ASSESSMENT — PAIN SCALES - GENERAL: PAINLEVEL_OUTOF10: 0-NO PAIN

## 2025-08-05 ASSESSMENT — ENCOUNTER SYMPTOMS
HEMATOLOGIC/LYMPHATIC NEGATIVE: 0
NEUROLOGICAL NEGATIVE: 0
GASTROINTESTINAL NEGATIVE: 0
ENDOCRINE NEGATIVE: 0
CONSTITUTIONAL NEGATIVE: 0
EYES NEGATIVE: 0
ALLERGIC/IMMUNOLOGIC NEGATIVE: 0
CARDIOVASCULAR NEGATIVE: 0
RESPIRATORY NEGATIVE: 0
MUSCULOSKELETAL NEGATIVE: 0
PSYCHIATRIC NEGATIVE: 0

## 2025-08-05 NOTE — PROGRESS NOTES
Subjective   Patient ID: Magda Hough is a 50 y.o. female who presents for No chief complaint on file..  HPI    Concerns:   Intermittent pelvic pain on her right side that radiates towards her back    Perimenopausal; LMP 10/2024  Contraception:  tubal ligation   Previous h/o AE moods with OCP    History of a DVT/PE: none  History of HTN: none  History of elevated lipids: LDL  Tobacco use: none  Personal history of breast cancer: none  H/o ruptured uterus after misoprostol for SAB    History of HT: none  History of compounded bioidentical: none      Postmenopausal bleeding: n/a  Mood changes: h/o anxiety and depression, PTSD; working with a therapist and on Lexapro; moods have worsened  Sleep problems: none  VMS: yes  Joint pain: yes  Brain fog/difficulty concentrating: yes  Lack of energy    GSM: none  are you sexually active?: yes  do you have any loss in desire?: yes  do you have any pain with intercourse?: yes which is impacting desire  are you able to have an orgasm?: no     Urinary incontinence: none    exercise: no    Calcium intake: adequate  Vitamin D intake: adequate  Increased risk of osteoporosis: none  Fracture since menopause: n/a    FH of breast cancer: maternal aunt  FH of ovarian cancer: none  FH of colon cancer: paternal grandfather  FH pancreatic cancer:  none  Review of Systems    Objective   Physical Exam  Genitourinary:     General: Normal vulva.      Vagina: Normal.      Comments: Pain with palpation of the bilateral pelvic floor muscles  No pain with palpation of the adnexa or uterus        Assessment/Plan   Diagnoses and all orders for this visit:  Menopausal syndrome on hormone replacement therapy  -     progesterone (Prometrium) 100 mg capsule; Take 1 capsule (100 mg) by mouth once daily. Take at bedtime  -     estradiol (Vivelle-DOT) 0.025 mg/24 hr patch; Place 1 patch over 96 hours on the skin 2 times a week.  Pelvic floor dysfunction in female  -     Referral to Physical Therapy;  Future  Pelvic pain  -     US PELVIS TRANSABDOMINAL WITH TRANSVAGINAL; Future  Other orders  -     Referral to Gynecology       Risks/benefits of MHT reviewed  Decision for transdermal d/t elevated lipids  I will contact her regarding the results of the pelvic US, but the pain may be d/t the pelvic floor    Abran Lynn, ISHMAEL-CNP 08/05/25 11:07 AM

## 2025-08-05 NOTE — LETTER
August 5, 2025     Quiana Scott DO  [address]  [address]  [facility] [address]    Patient: Magda Hough   YOB: 1975   Date of Visit: 8/5/2025       Dear Dr. Quiana Scott DO:    Thank you for referring Magda Hoguh to me for evaluation. Below are my notes for this consultation.  If you have questions, please do not hesitate to call me. I look forward to following your patient along with you.       Sincerely,     Abran Lynn, APRN-CNP      CC: No Recipients  ______________________________________________________________________________________    Subjective  Patient ID: Magda Hough is a 50 y.o. female who presents for No chief complaint on file..  HPI    Concerns:   Intermittent pelvic pain on her right side that radiates towards her back    Perimenopausal; LMP 10/2024  Contraception:  tubal ligation   Previous h/o AE moods with OCP    History of a DVT/PE: none  History of HTN: none  History of elevated lipids: LDL  Tobacco use: none  Personal history of breast cancer: none  H/o ruptured uterus after misoprostol for SAB    History of HT: none  History of compounded bioidentical: none      Postmenopausal bleeding: n/a  Mood changes: h/o anxiety and depression, PTSD; working with a therapist and on Lexapro; moods have worsened  Sleep problems: none  VMS: yes  Joint pain: yes  Brain fog/difficulty concentrating: yes  Lack of energy    GSM: none  are you sexually active?: yes  do you have any loss in desire?: yes  do you have any pain with intercourse?: yes which is impacting desire  are you able to have an orgasm?: no     Urinary incontinence: none    exercise: no    Calcium intake: adequate  Vitamin D intake: adequate  Increased risk of osteoporosis: none  Fracture since menopause: n/a    FH of breast cancer: maternal aunt  FH of ovarian cancer: none  FH of colon cancer: paternal grandfather  FH pancreatic cancer:  none  Review of Systems    Objective  Physical Exam  Genitourinary:      General: Normal vulva.      Vagina: Normal.      Comments: Pain with palpation of the bilateral pelvic floor muscles  No pain with palpation of the adnexa or uterus        Assessment/Plan  Diagnoses and all orders for this visit:  Menopausal syndrome on hormone replacement therapy  -     progesterone (Prometrium) 100 mg capsule; Take 1 capsule (100 mg) by mouth once daily. Take at bedtime  -     estradiol (Vivelle-DOT) 0.025 mg/24 hr patch; Place 1 patch over 96 hours on the skin 2 times a week.  Pelvic floor dysfunction in female  -     Referral to Physical Therapy; Future  Pelvic pain  -     US PELVIS TRANSABDOMINAL WITH TRANSVAGINAL; Future  Other orders  -     Referral to Gynecology       Risks/benefits of MHT reviewed  Decision for transdermal d/t elevated lipids  I will contact her regarding the results of the pelvic US, but the pain may be d/t the pelvic floor    ISHMAEL Chin-CNP 08/05/25 11:07 AM

## 2025-08-06 DIAGNOSIS — N95.1 MENOPAUSAL SYNDROME ON HORMONE REPLACEMENT THERAPY: ICD-10-CM

## 2025-08-06 DIAGNOSIS — Z79.890 MENOPAUSAL SYNDROME ON HORMONE REPLACEMENT THERAPY: ICD-10-CM

## 2025-08-06 RX ORDER — ESTRADIOL 0.03 MG/D
1 FILM, EXTENDED RELEASE TRANSDERMAL 2 TIMES WEEKLY
Qty: 24 PATCH | Refills: 3 | Status: SHIPPED | OUTPATIENT
Start: 2025-08-07 | End: 2025-09-06

## 2025-08-09 ENCOUNTER — HOSPITAL ENCOUNTER (OUTPATIENT)
Dept: RADIOLOGY | Facility: HOSPITAL | Age: 50
Discharge: HOME | End: 2025-08-09
Payer: COMMERCIAL

## 2025-08-09 DIAGNOSIS — R10.2 PELVIC PAIN: ICD-10-CM

## 2025-08-09 PROCEDURE — 76856 US EXAM PELVIC COMPLETE: CPT

## 2025-08-09 PROCEDURE — 76830 TRANSVAGINAL US NON-OB: CPT | Performed by: RADIOLOGY

## 2025-08-09 PROCEDURE — 76856 US EXAM PELVIC COMPLETE: CPT | Performed by: RADIOLOGY

## 2025-09-29 ENCOUNTER — APPOINTMENT (OUTPATIENT)
Dept: PRIMARY CARE | Facility: CLINIC | Age: 50
End: 2025-09-29
Payer: COMMERCIAL